# Patient Record
Sex: FEMALE | Race: BLACK OR AFRICAN AMERICAN | NOT HISPANIC OR LATINO | URBAN - METROPOLITAN AREA
[De-identification: names, ages, dates, MRNs, and addresses within clinical notes are randomized per-mention and may not be internally consistent; named-entity substitution may affect disease eponyms.]

---

## 2023-10-19 PROBLEM — E66.9 OBESITY (BMI 30-39.9): Status: ACTIVE | Noted: 2023-10-19

## 2023-10-19 PROBLEM — R53.83 FATIGUE: Status: ACTIVE | Noted: 2023-10-19

## 2023-10-19 RX ORDER — DOCUSATE SODIUM 100 MG/1
1 CAPSULE, LIQUID FILLED ORAL 2 TIMES DAILY PRN
COMMUNITY
Start: 2018-11-02

## 2023-10-19 RX ORDER — NAPROXEN 500 MG/1
1 TABLET ORAL 2 TIMES DAILY
COMMUNITY
Start: 2019-02-06

## 2023-10-20 ENCOUNTER — TELEPHONE (OUTPATIENT)
Dept: OBSTETRICS AND GYNECOLOGY | Facility: CLINIC | Age: 43
End: 2023-10-20
Payer: COMMERCIAL

## 2023-10-20 ENCOUNTER — APPOINTMENT (OUTPATIENT)
Dept: OBSTETRICS AND GYNECOLOGY | Facility: CLINIC | Age: 43
End: 2023-10-20
Payer: COMMERCIAL

## 2023-10-20 NOTE — TELEPHONE ENCOUNTER
Pt has apt for APE today Provider does not have a schedule. Called pt to reschedule LM to call office at [phone].

## 2024-07-16 ENCOUNTER — HOSPITAL ENCOUNTER (EMERGENCY)
Facility: HOSPITAL | Age: 44
Discharge: HOME | End: 2024-07-16
Payer: COMMERCIAL

## 2024-07-16 VITALS
WEIGHT: 183 LBS | SYSTOLIC BLOOD PRESSURE: 150 MMHG | RESPIRATION RATE: 18 BRPM | TEMPERATURE: 98.3 F | HEART RATE: 79 BPM | DIASTOLIC BLOOD PRESSURE: 100 MMHG | OXYGEN SATURATION: 99 % | BODY MASS INDEX: 34.58 KG/M2

## 2024-07-16 DIAGNOSIS — N76.0 BV (BACTERIAL VAGINOSIS): ICD-10-CM

## 2024-07-16 DIAGNOSIS — R10.13 EPIGASTRIC PAIN: ICD-10-CM

## 2024-07-16 DIAGNOSIS — N30.00 ACUTE CYSTITIS WITHOUT HEMATURIA: Primary | ICD-10-CM

## 2024-07-16 DIAGNOSIS — B96.89 BV (BACTERIAL VAGINOSIS): ICD-10-CM

## 2024-07-16 LAB
ALBUMIN SERPL BCP-MCNC: 4.4 G/DL (ref 3.4–5)
ALP SERPL-CCNC: 66 U/L (ref 33–110)
ALT SERPL W P-5'-P-CCNC: 11 U/L (ref 7–45)
ANION GAP SERPL CALC-SCNC: 14 MMOL/L (ref 10–20)
APPEARANCE UR: ABNORMAL
AST SERPL W P-5'-P-CCNC: 14 U/L (ref 9–39)
BASOPHILS # BLD AUTO: 0.03 X10*3/UL (ref 0–0.1)
BASOPHILS NFR BLD AUTO: 0.2 %
BILIRUB SERPL-MCNC: 0.4 MG/DL (ref 0–1.2)
BILIRUB UR STRIP.AUTO-MCNC: NEGATIVE MG/DL
BUN SERPL-MCNC: 8 MG/DL (ref 6–23)
CALCIUM SERPL-MCNC: 9.5 MG/DL (ref 8.6–10.6)
CHLORIDE SERPL-SCNC: 101 MMOL/L (ref 98–107)
CLUE CELLS SPEC QL WET PREP: PRESENT
CO2 SERPL-SCNC: 28 MMOL/L (ref 21–32)
COLOR UR: ABNORMAL
CREAT SERPL-MCNC: 0.67 MG/DL (ref 0.5–1.05)
EGFRCR SERPLBLD CKD-EPI 2021: >90 ML/MIN/1.73M*2
EOSINOPHIL # BLD AUTO: 0.06 X10*3/UL (ref 0–0.7)
EOSINOPHIL NFR BLD AUTO: 0.4 %
ERYTHROCYTE [DISTWIDTH] IN BLOOD BY AUTOMATED COUNT: 13.6 % (ref 11.5–14.5)
GLUCOSE SERPL-MCNC: 101 MG/DL (ref 74–99)
GLUCOSE UR STRIP.AUTO-MCNC: NORMAL MG/DL
HCT VFR BLD AUTO: 40.7 % (ref 36–46)
HGB BLD-MCNC: 13.8 G/DL (ref 12–16)
IMM GRANULOCYTES # BLD AUTO: 0.04 X10*3/UL (ref 0–0.7)
IMM GRANULOCYTES NFR BLD AUTO: 0.3 % (ref 0–0.9)
KETONES UR STRIP.AUTO-MCNC: ABNORMAL MG/DL
LEUKOCYTE ESTERASE UR QL STRIP.AUTO: ABNORMAL
LIPASE SERPL-CCNC: 26 U/L (ref 9–82)
LYMPHOCYTES # BLD AUTO: 2.02 X10*3/UL (ref 1.2–4.8)
LYMPHOCYTES NFR BLD AUTO: 15.1 %
MCH RBC QN AUTO: 28.4 PG (ref 26–34)
MCHC RBC AUTO-ENTMCNC: 33.9 G/DL (ref 32–36)
MCV RBC AUTO: 84 FL (ref 80–100)
MONOCYTES # BLD AUTO: 0.98 X10*3/UL (ref 0.1–1)
MONOCYTES NFR BLD AUTO: 7.3 %
MUCOUS THREADS #/AREA URNS AUTO: ABNORMAL /LPF
NEUTROPHILS # BLD AUTO: 10.24 X10*3/UL (ref 1.2–7.7)
NEUTROPHILS NFR BLD AUTO: 76.7 %
NITRITE UR QL STRIP.AUTO: NEGATIVE
NRBC BLD-RTO: 0 /100 WBCS (ref 0–0)
PH UR STRIP.AUTO: 8.5 [PH]
PLATELET # BLD AUTO: 245 X10*3/UL (ref 150–450)
POTASSIUM SERPL-SCNC: 3.7 MMOL/L (ref 3.5–5.3)
PREGNANCY TEST URINE, POC: NEGATIVE
PROT SERPL-MCNC: 7.7 G/DL (ref 6.4–8.2)
PROT UR STRIP.AUTO-MCNC: ABNORMAL MG/DL
RBC # BLD AUTO: 4.86 X10*6/UL (ref 4–5.2)
RBC # UR STRIP.AUTO: ABNORMAL /UL
RBC #/AREA URNS AUTO: >20 /HPF
SODIUM SERPL-SCNC: 139 MMOL/L (ref 136–145)
SP GR UR STRIP.AUTO: 1.04
SQUAMOUS #/AREA URNS AUTO: ABNORMAL /HPF
T VAGINALIS SPEC QL WET PREP: ABNORMAL
UROBILINOGEN UR STRIP.AUTO-MCNC: ABNORMAL MG/DL
WBC # BLD AUTO: 13.4 X10*3/UL (ref 4.4–11.3)
WBC #/AREA URNS AUTO: ABNORMAL /HPF
WBC VAG QL WET PREP: ABNORMAL
YEAST VAG QL WET PREP: ABNORMAL

## 2024-07-16 PROCEDURE — 96375 TX/PRO/DX INJ NEW DRUG ADDON: CPT

## 2024-07-16 PROCEDURE — 2500000004 HC RX 250 GENERAL PHARMACY W/ HCPCS (ALT 636 FOR OP/ED): Mod: SE | Performed by: PHYSICIAN ASSISTANT

## 2024-07-16 PROCEDURE — 81001 URINALYSIS AUTO W/SCOPE: CPT | Performed by: PHYSICIAN ASSISTANT

## 2024-07-16 PROCEDURE — 99284 EMERGENCY DEPT VISIT MOD MDM: CPT | Performed by: PHYSICIAN ASSISTANT

## 2024-07-16 PROCEDURE — 80053 COMPREHEN METABOLIC PANEL: CPT | Performed by: PHYSICIAN ASSISTANT

## 2024-07-16 PROCEDURE — 96361 HYDRATE IV INFUSION ADD-ON: CPT

## 2024-07-16 PROCEDURE — 99284 EMERGENCY DEPT VISIT MOD MDM: CPT | Mod: 25

## 2024-07-16 PROCEDURE — 83690 ASSAY OF LIPASE: CPT | Performed by: PHYSICIAN ASSISTANT

## 2024-07-16 PROCEDURE — 81025 URINE PREGNANCY TEST: CPT | Performed by: PHYSICIAN ASSISTANT

## 2024-07-16 PROCEDURE — 85025 COMPLETE CBC W/AUTO DIFF WBC: CPT | Performed by: PHYSICIAN ASSISTANT

## 2024-07-16 PROCEDURE — 96374 THER/PROPH/DIAG INJ IV PUSH: CPT

## 2024-07-16 PROCEDURE — 36415 COLL VENOUS BLD VENIPUNCTURE: CPT | Performed by: PHYSICIAN ASSISTANT

## 2024-07-16 PROCEDURE — 87491 CHLMYD TRACH DNA AMP PROBE: CPT | Performed by: PHYSICIAN ASSISTANT

## 2024-07-16 PROCEDURE — 87210 SMEAR WET MOUNT SALINE/INK: CPT | Performed by: PHYSICIAN ASSISTANT

## 2024-07-16 RX ORDER — ACETAMINOPHEN 325 MG/1
975 TABLET ORAL ONCE
Status: COMPLETED | OUTPATIENT
Start: 2024-07-16 | End: 2024-07-16

## 2024-07-16 RX ORDER — ONDANSETRON HYDROCHLORIDE 2 MG/ML
4 INJECTION, SOLUTION INTRAVENOUS ONCE
Status: COMPLETED | OUTPATIENT
Start: 2024-07-16 | End: 2024-07-16

## 2024-07-16 RX ORDER — METRONIDAZOLE 500 MG/1
500 TABLET ORAL 2 TIMES DAILY
Qty: 14 TABLET | Refills: 0 | Status: SHIPPED | OUTPATIENT
Start: 2024-07-16 | End: 2024-07-23

## 2024-07-16 RX ORDER — NITROFURANTOIN 25; 75 MG/1; MG/1
100 CAPSULE ORAL 2 TIMES DAILY
Qty: 10 CAPSULE | Refills: 0 | Status: SHIPPED | OUTPATIENT
Start: 2024-07-16 | End: 2024-07-21

## 2024-07-16 RX ORDER — FAMOTIDINE 10 MG/ML
20 INJECTION INTRAVENOUS ONCE
Status: COMPLETED | OUTPATIENT
Start: 2024-07-16 | End: 2024-07-16

## 2024-07-16 RX ORDER — KETOROLAC TROMETHAMINE 15 MG/ML
15 INJECTION, SOLUTION INTRAMUSCULAR; INTRAVENOUS ONCE
Status: COMPLETED | OUTPATIENT
Start: 2024-07-16 | End: 2024-07-16

## 2024-07-16 RX ORDER — ONDANSETRON 4 MG/1
4 TABLET, ORALLY DISINTEGRATING ORAL EVERY 8 HOURS PRN
Qty: 20 TABLET | Refills: 0 | Status: SHIPPED | OUTPATIENT
Start: 2024-07-16 | End: 2024-07-23

## 2024-07-16 RX ORDER — FAMOTIDINE 20 MG/1
20 TABLET, FILM COATED ORAL 2 TIMES DAILY PRN
Qty: 30 TABLET | Refills: 0 | Status: SHIPPED | OUTPATIENT
Start: 2024-07-16 | End: 2024-07-31

## 2024-07-16 ASSESSMENT — COLUMBIA-SUICIDE SEVERITY RATING SCALE - C-SSRS
1. IN THE PAST MONTH, HAVE YOU WISHED YOU WERE DEAD OR WISHED YOU COULD GO TO SLEEP AND NOT WAKE UP?: NO
2. HAVE YOU ACTUALLY HAD ANY THOUGHTS OF KILLING YOURSELF?: NO
6. HAVE YOU EVER DONE ANYTHING, STARTED TO DO ANYTHING, OR PREPARED TO DO ANYTHING TO END YOUR LIFE?: NO

## 2024-07-16 ASSESSMENT — PAIN DESCRIPTION - LOCATION: LOCATION: ABDOMEN

## 2024-07-16 ASSESSMENT — LIFESTYLE VARIABLES
TOTAL SCORE: 0
EVER HAD A DRINK FIRST THING IN THE MORNING TO STEADY YOUR NERVES TO GET RID OF A HANGOVER: NO
HAVE YOU EVER FELT YOU SHOULD CUT DOWN ON YOUR DRINKING: NO
EVER FELT BAD OR GUILTY ABOUT YOUR DRINKING: NO
HAVE PEOPLE ANNOYED YOU BY CRITICIZING YOUR DRINKING: NO

## 2024-07-16 ASSESSMENT — PAIN SCALES - GENERAL: PAINLEVEL_OUTOF10: 10 - WORST POSSIBLE PAIN

## 2024-07-16 NOTE — ED TRIAGE NOTES
UPPER ABD PAIN STARTED YESTERDAY. N/V AND PAIN. DENIES URINARY SYMPTOMS, CONCERN FOR STDS. ENDORSES BEING ON HER CYCLE AND DECREASED PO INTAKE.

## 2024-07-16 NOTE — ED PROVIDER NOTES
HPI   Chief Complaint   Patient presents with    Abdominal Pain    Vomiting    Nausea       This is a 44-year-old female with reported history of bilateral salpingectomy who presents to the emergency department with concern for couple days of epigastric pain, nausea and vomiting of nonbloody nonbilious emesis.  Has no associated urinary symptoms or diarrhea or constipation.  The patient is also requesting to get tested for STDs.  She is in a monogamous relationship.  She is quite tearful during my encounter.              Patient History   Past Medical History:   Diagnosis Date    Encounter for screening for infections with a predominantly sexual mode of transmission 02/19/2015    Screen for STD (sexually transmitted disease)     Past Surgical History:   Procedure Laterality Date    OTHER SURGICAL HISTORY  11/20/2018    Laparoscopic tubal ligation     Family History   Problem Relation Name Age of Onset    No Known Problems Mother      Colon cancer Maternal Grandmother       Social History     Tobacco Use    Smoking status: Not on file    Smokeless tobacco: Not on file   Substance Use Topics    Alcohol use: Not on file    Drug use: Not on file       Physical Exam   ED Triage Vitals [07/16/24 1554]   Temperature Heart Rate Respirations BP   36.8 °C (98.3 °F) 79 18 (!) 150/100      Pulse Ox Temp src Heart Rate Source Patient Position   99 % -- -- --      BP Location FiO2 (%)     -- --       Physical Exam  Vitals reviewed.   Constitutional:       Appearance: She is well-developed.   HENT:      Head: Normocephalic and atraumatic.   Cardiovascular:      Rate and Rhythm: Normal rate and regular rhythm.   Pulmonary:      Effort: Pulmonary effort is normal.      Breath sounds: Normal breath sounds.   Abdominal:      Tenderness: There is abdominal tenderness in the epigastric area. There is no guarding or rebound.   Skin:     General: Skin is warm.   Neurological:      Mental Status: She is alert.           ED Course & MDM    Diagnoses as of 07/16/24 2152   Acute cystitis without hematuria   BV (bacterial vaginosis)   Epigastric pain                       Tristen Coma Scale Score: 15                        Medical Decision Making  34-year-old female, is alert and oriented x 3, afebrile and hemodynamically stable.  Presenting with concern for nausea, vomiting, upper abdominal pain.    Examination reveals a soft abdomen that is tender in the epigastrium.  IV access was established, she was given a liter of LR, Zofran, Pepcid and Toradol.    Vaginal collection obtained by self swab.  Wet prep positive for clue cells, negative for yeast and trichomonas.  Urinalysis revealing elevated WBC, leukocyte esterase.    The patient passed p.o. challenge and was feeling better.  She will be discharged with a prescription for Flagyl and Macrobid, as well as Pepcid and Zofran.  PCP follow-up advised.        Procedure  Procedures     Osbaldo Choi PA-C  07/16/24 2154

## 2024-07-17 LAB
C TRACH RRNA SPEC QL NAA+PROBE: NEGATIVE
N GONORRHOEA DNA SPEC QL PROBE+SIG AMP: NEGATIVE

## 2024-09-09 ENCOUNTER — APPOINTMENT (OUTPATIENT)
Dept: OBSTETRICS AND GYNECOLOGY | Facility: CLINIC | Age: 44
End: 2024-09-09
Payer: COMMERCIAL

## 2024-09-11 PROBLEM — N76.0 BACTERIAL VAGINOSIS: Status: ACTIVE | Noted: 2024-09-11

## 2024-09-11 PROBLEM — N30.00 ACUTE CYSTITIS WITHOUT HEMATURIA: Status: ACTIVE | Noted: 2024-07-16

## 2024-09-11 PROBLEM — B96.89 BACTERIAL VAGINOSIS: Status: ACTIVE | Noted: 2024-07-26

## 2024-09-11 PROBLEM — N76.0 BACTERIAL VAGINOSIS: Status: ACTIVE | Noted: 2024-07-26

## 2024-09-11 PROBLEM — B96.89 BACTERIAL VAGINOSIS: Status: ACTIVE | Noted: 2024-09-11

## 2024-10-04 ENCOUNTER — APPOINTMENT (OUTPATIENT)
Dept: OBSTETRICS AND GYNECOLOGY | Facility: CLINIC | Age: 44
End: 2024-10-04
Payer: COMMERCIAL

## 2024-10-11 ENCOUNTER — APPOINTMENT (OUTPATIENT)
Dept: OBSTETRICS AND GYNECOLOGY | Facility: CLINIC | Age: 44
End: 2024-10-11
Payer: COMMERCIAL

## 2024-10-23 ENCOUNTER — LAB (OUTPATIENT)
Dept: LAB | Facility: LAB | Age: 44
End: 2024-10-23
Payer: COMMERCIAL

## 2024-10-23 ENCOUNTER — OFFICE VISIT (OUTPATIENT)
Dept: OBSTETRICS AND GYNECOLOGY | Facility: CLINIC | Age: 44
End: 2024-10-23
Payer: COMMERCIAL

## 2024-10-23 VITALS
HEART RATE: 98 BPM | HEIGHT: 61 IN | WEIGHT: 181.6 LBS | SYSTOLIC BLOOD PRESSURE: 107 MMHG | BODY MASS INDEX: 34.29 KG/M2 | DIASTOLIC BLOOD PRESSURE: 77 MMHG

## 2024-10-23 DIAGNOSIS — Z11.3 SCREENING EXAMINATION FOR STI: ICD-10-CM

## 2024-10-23 DIAGNOSIS — N92.0 MENORRHAGIA WITH REGULAR CYCLE: ICD-10-CM

## 2024-10-23 DIAGNOSIS — Z71.6 ENCOUNTER FOR TOBACCO USE CESSATION COUNSELING: Primary | ICD-10-CM

## 2024-10-23 LAB
ANION GAP SERPL CALC-SCNC: 13 MMOL/L (ref 10–20)
BUN SERPL-MCNC: 10 MG/DL (ref 6–23)
CALCIUM SERPL-MCNC: 9.1 MG/DL (ref 8.6–10.6)
CHLORIDE SERPL-SCNC: 104 MMOL/L (ref 98–107)
CO2 SERPL-SCNC: 25 MMOL/L (ref 21–32)
CREAT SERPL-MCNC: 0.63 MG/DL (ref 0.5–1.05)
EGFRCR SERPLBLD CKD-EPI 2021: >90 ML/MIN/1.73M*2
ERYTHROCYTE [DISTWIDTH] IN BLOOD BY AUTOMATED COUNT: 14.1 % (ref 11.5–14.5)
EST. AVERAGE GLUCOSE BLD GHB EST-MCNC: 100 MG/DL
GLUCOSE SERPL-MCNC: 104 MG/DL (ref 74–99)
HBA1C MFR BLD: 5.1 %
HBV SURFACE AG SERPL QL IA: NONREACTIVE
HCT VFR BLD AUTO: 38.1 % (ref 36–46)
HCV AB SER QL: NONREACTIVE
HGB BLD-MCNC: 11.5 G/DL (ref 12–16)
HIV 1+2 AB+HIV1 P24 AG SERPL QL IA: NONREACTIVE
MCH RBC QN AUTO: 25.8 PG (ref 26–34)
MCHC RBC AUTO-ENTMCNC: 30.2 G/DL (ref 32–36)
MCV RBC AUTO: 86 FL (ref 80–100)
NRBC BLD-RTO: 0 /100 WBCS (ref 0–0)
PLATELET # BLD AUTO: 305 X10*3/UL (ref 150–450)
POTASSIUM SERPL-SCNC: 4.6 MMOL/L (ref 3.5–5.3)
RBC # BLD AUTO: 4.45 X10*6/UL (ref 4–5.2)
SODIUM SERPL-SCNC: 137 MMOL/L (ref 136–145)
TREPONEMA PALLIDUM IGG+IGM AB [PRESENCE] IN SERUM OR PLASMA BY IMMUNOASSAY: NONREACTIVE
TSH SERPL-ACNC: 0.77 MIU/L (ref 0.44–3.98)
WBC # BLD AUTO: 7.8 X10*3/UL (ref 4.4–11.3)

## 2024-10-23 PROCEDURE — 36415 COLL VENOUS BLD VENIPUNCTURE: CPT

## 2024-10-23 PROCEDURE — 87661 TRICHOMONAS VAGINALIS AMPLIF: CPT | Performed by: STUDENT IN AN ORGANIZED HEALTH CARE EDUCATION/TRAINING PROGRAM

## 2024-10-23 PROCEDURE — 83036 HEMOGLOBIN GLYCOSYLATED A1C: CPT

## 2024-10-23 PROCEDURE — 87340 HEPATITIS B SURFACE AG IA: CPT

## 2024-10-23 PROCEDURE — 2500000001 HC RX 250 WO HCPCS SELF ADMINISTERED DRUGS (ALT 637 FOR MEDICARE OP): Mod: SE | Performed by: STUDENT IN AN ORGANIZED HEALTH CARE EDUCATION/TRAINING PROGRAM

## 2024-10-23 PROCEDURE — 85027 COMPLETE CBC AUTOMATED: CPT

## 2024-10-23 PROCEDURE — 86803 HEPATITIS C AB TEST: CPT

## 2024-10-23 PROCEDURE — 80048 BASIC METABOLIC PNL TOTAL CA: CPT

## 2024-10-23 PROCEDURE — 99386 PREV VISIT NEW AGE 40-64: CPT | Performed by: STUDENT IN AN ORGANIZED HEALTH CARE EDUCATION/TRAINING PROGRAM

## 2024-10-23 PROCEDURE — 99214 OFFICE O/P EST MOD 30 MIN: CPT | Performed by: STUDENT IN AN ORGANIZED HEALTH CARE EDUCATION/TRAINING PROGRAM

## 2024-10-23 PROCEDURE — 2500000004 HC RX 250 GENERAL PHARMACY W/ HCPCS (ALT 636 FOR OP/ED): Mod: SE | Performed by: STUDENT IN AN ORGANIZED HEALTH CARE EDUCATION/TRAINING PROGRAM

## 2024-10-23 PROCEDURE — 99406 BEHAV CHNG SMOKING 3-10 MIN: CPT | Performed by: STUDENT IN AN ORGANIZED HEALTH CARE EDUCATION/TRAINING PROGRAM

## 2024-10-23 PROCEDURE — 87389 HIV-1 AG W/HIV-1&-2 AB AG IA: CPT

## 2024-10-23 PROCEDURE — 87491 CHLMYD TRACH DNA AMP PROBE: CPT | Performed by: STUDENT IN AN ORGANIZED HEALTH CARE EDUCATION/TRAINING PROGRAM

## 2024-10-23 PROCEDURE — 58100 BIOPSY OF UTERUS LINING: CPT | Performed by: STUDENT IN AN ORGANIZED HEALTH CARE EDUCATION/TRAINING PROGRAM

## 2024-10-23 PROCEDURE — 86780 TREPONEMA PALLIDUM: CPT

## 2024-10-23 PROCEDURE — 96372 THER/PROPH/DIAG INJ SC/IM: CPT | Performed by: STUDENT IN AN ORGANIZED HEALTH CARE EDUCATION/TRAINING PROGRAM

## 2024-10-23 PROCEDURE — 84443 ASSAY THYROID STIM HORMONE: CPT

## 2024-10-23 RX ORDER — ACETAMINOPHEN 325 MG/1
975 TABLET ORAL ONCE
OUTPATIENT
Start: 2024-10-23 | End: 2024-10-23

## 2024-10-23 RX ORDER — CELECOXIB 400 MG/1
400 CAPSULE ORAL ONCE
OUTPATIENT
Start: 2024-10-23 | End: 2024-10-23

## 2024-10-23 RX ORDER — GABAPENTIN 600 MG/1
600 TABLET ORAL ONCE
OUTPATIENT
Start: 2024-10-23 | End: 2024-10-23

## 2024-10-23 RX ORDER — NICOTINE 7MG/24HR
1 PATCH, TRANSDERMAL 24 HOURS TRANSDERMAL EVERY 24 HOURS
Qty: 21 PATCH | Refills: 0 | Status: SHIPPED | OUTPATIENT
Start: 2024-12-04 | End: 2024-12-25

## 2024-10-23 RX ORDER — IBUPROFEN 200 MG
1 TABLET ORAL EVERY 24 HOURS
Qty: 21 PATCH | Refills: 0 | Status: SHIPPED | OUTPATIENT
Start: 2024-10-23 | End: 2024-11-13

## 2024-10-23 RX ORDER — IBUPROFEN 200 MG
1 TABLET ORAL EVERY 24 HOURS
Qty: 21 PATCH | Refills: 0 | Status: SHIPPED | OUTPATIENT
Start: 2024-11-13 | End: 2024-12-04

## 2024-10-23 RX ORDER — ASPIRIN/CALCIUM CARB/MAGNESIUM 325 MG
4 TABLET ORAL EVERY 2 HOUR PRN
Qty: 100 LOZENGE | Refills: 2 | Status: SHIPPED | OUTPATIENT
Start: 2024-10-23 | End: 2024-11-22

## 2024-10-23 RX ORDER — IBUPROFEN 600 MG/1
600 TABLET ORAL ONCE
Status: COMPLETED | OUTPATIENT
Start: 2024-10-23 | End: 2024-10-23

## 2024-10-23 RX ORDER — MEDROXYPROGESTERONE ACETATE 150 MG/ML
150 INJECTION, SUSPENSION INTRAMUSCULAR
Status: SHIPPED | OUTPATIENT
Start: 2024-10-23 | End: 2025-10-18

## 2024-10-23 ASSESSMENT — COLUMBIA-SUICIDE SEVERITY RATING SCALE - C-SSRS
1. IN THE PAST MONTH, HAVE YOU WISHED YOU WERE DEAD OR WISHED YOU COULD GO TO SLEEP AND NOT WAKE UP?: NO
6. HAVE YOU EVER DONE ANYTHING, STARTED TO DO ANYTHING, OR PREPARED TO DO ANYTHING TO END YOUR LIFE?: NO
2. HAVE YOU ACTUALLY HAD ANY THOUGHTS OF KILLING YOURSELF?: NO

## 2024-10-23 ASSESSMENT — ENCOUNTER SYMPTOMS
EYES NEGATIVE: 0
CARDIOVASCULAR NEGATIVE: 0
ALLERGIC/IMMUNOLOGIC NEGATIVE: 0
MUSCULOSKELETAL NEGATIVE: 0
NEUROLOGICAL NEGATIVE: 0
PSYCHIATRIC NEGATIVE: 0
GASTROINTESTINAL NEGATIVE: 0
CONSTITUTIONAL NEGATIVE: 0
ENDOCRINE NEGATIVE: 0
RESPIRATORY NEGATIVE: 0
HEMATOLOGIC/LYMPHATIC NEGATIVE: 0

## 2024-10-23 ASSESSMENT — PATIENT HEALTH QUESTIONNAIRE - PHQ9
2. FEELING DOWN, DEPRESSED OR HOPELESS: NOT AT ALL
SUM OF ALL RESPONSES TO PHQ9 QUESTIONS 1 AND 2: 0
1. LITTLE INTEREST OR PLEASURE IN DOING THINGS: NOT AT ALL

## 2024-10-23 NOTE — ASSESSMENT & PLAN NOTE
Discussed requirement for cessation prior to surgery  Reviewed NRT - desires patch and lozenge  Encouragement provided, Rx sent

## 2024-10-23 NOTE — PROGRESS NOTES
Patient here for Depo injection.  Last Depo: first  discussed Depo-Provera side effects ,calcium.  Depo given IM Right Ventrogluteal  region. Depo supplied by office depo. Patient tolerated well.  Patient to RTC between 01/08/2025 to 01/22/2025 for depo.  Patient verbalized understanding and all questions were answered.

## 2024-10-23 NOTE — ASSESSMENT & PLAN NOTE
Reviewed physiology of menstrual cycle including roles of estrogen and progesterone. Discussed cyclic nature of hormones and roles in ovulation and menstruation.  Reviewed various etiologies of abnormal uterine bleeding including structural (polyps, adenomyosis, fibroids, malignancy) and physiologic causes (clotting disorder, ovulatory dysfunction, iatrogenic source).  Previous imaging: none.  Previous biopsies/surgeries: none.  Based on her risk factors of obesity, multiparity, differential diagnosis includes hyperplasia, adenomyosis, fibroids.  Prior treatments: none.  Discussed management options including various forms of hormonal control (pill, patch, ring, injection, implant and IUD) as well as surgical approaches. Briefly discussed risks, benefits of each.   Patient does not desire future fertility.   Recommendations:  - Imaging: TVUS ordered..  - Management: Desires surgical management. Depo today for bridging to surgical scheduling while quitting smoking and completing evaluation. CBC. BMP. TSH  - Pap UTD.  - EMB today without issue.  - Appropriate candidate for vNOTES hysterectomy pending US results.  - Case request entered, pre-op visit on 1/3/24 with planned surgery in Jan.

## 2024-10-23 NOTE — PROGRESS NOTES
"Dayami Major is a 44 y.o.  female who is here for a routine exam.   PCP = No primary care provider on file.    Subjective     Concerns today: Heavy menstrual bleeding - uses 10 pads/day with period. Very month, lasting 7 days, heaviest first 3-4 days. S/p BTL. Also reports hot flashes. Also reports heavy cramping. Interested in hysterectomy. Takes ibuprofen.    OB/Gyn History:  Menarche/Menopause: unsure  Menstrual cycle concerns: as above  Sexual Health Concerns: none  Current contraception: s/p salpingectomy    Preventative:  Mammogram: due   Last Colonoscopy: age 46   DM Screening: due  HPV vaccination: n/a  Exercise: active lifestyle, no particular  Diet: no particular  Seat Belt Use: always    Social History:  Living Situation: lives with children and mom  School/Employment: manager at [employer]  Substance use updated in chart. Tobacco cessation counseling provided, total time 6 mins.  Safe at Home?: Yes  Depression Screen/Mood concerns: No    Family history (specifically breast, ovarian, colon cancer) reviewed and updated in chart.   Personal medical and surgical history reviewed and updated in chart.   Obstetric history reviewed and updated in chart.  Pap history reviewed and updated in chart.         Objective   /77   Pulse 98   Ht 1.549 m (5' 1\")   Wt 82.4 kg (181 lb 9.6 oz)   LMP 10/06/2024   BMI 34.31 kg/m²   Physical Exam  Vitals reviewed. Exam conducted with a chaperone present.   Constitutional:       Appearance: Normal appearance.   HENT:      Head: Normocephalic.   Cardiovascular:      Rate and Rhythm: Normal rate and regular rhythm.   Pulmonary:      Effort: Pulmonary effort is normal. No respiratory distress.   Chest:   Breasts:     Right: Normal. No swelling, mass or skin change.      Left: Normal. No swelling, mass or skin change.   Abdominal:      General: There is no distension.      Palpations: Abdomen is soft. There is no mass.      Tenderness: There is no abdominal " tenderness. There is no guarding or rebound.   Genitourinary:     Comments: Normal appearing external female genitalia. Vaginal mucosa normal appearing without lesions. Cervix without lesions.    Bimanual exam with normal sized uterus, mobile, anteverted, no adnexal masses or tenderness.  Lymphadenopathy:      Upper Body:      Right upper body: No supraclavicular, axillary or pectoral adenopathy.      Left upper body: No supraclavicular, axillary or pectoral adenopathy.   Skin:     General: Skin is warm and dry.   Neurological:      General: No focal deficit present.      Mental Status: She is alert.   Psychiatric:         Mood and Affect: Mood normal.         Behavior: Behavior normal.         Thought Content: Thought content normal.         Judgment: Judgment normal.        Biopsy endometrium    Date/Time: 10/23/2024 1:30 PM    Performed by: Sarath Nogueira MD  Authorized by: Sarath Nogueira MD    Comments:     Procedure comments: EMB Procedure Note    The patient was placed in dorsal lithotomy position. A speculum was inserted. The cervix was prepped with betadine. Endometrial biopsy pipelle inserted through external cervical os. Suction was applied and tissue collected. 1 pass. The speculum was removed. There were no complications. The patient tolerated procedure well and was given the routine instructions.              Assessment/Plan      Dayami Major is a 44 y.o.  female presenting today for annual exam with the following problems addressed:    Problem List Items Addressed This Visit       Menorrhagia with regular cycle     Reviewed physiology of menstrual cycle including roles of estrogen and progesterone. Discussed cyclic nature of hormones and roles in ovulation and menstruation.  Reviewed various etiologies of abnormal uterine bleeding including structural (polyps, adenomyosis, fibroids, malignancy) and physiologic causes (clotting disorder, ovulatory dysfunction, iatrogenic  source).  Previous imaging: none.  Previous biopsies/surgeries: none.  Based on her risk factors of obesity, multiparity, differential diagnosis includes hyperplasia, adenomyosis, fibroids.  Prior treatments: none.  Discussed management options including various forms of hormonal control (pill, patch, ring, injection, implant and IUD) as well as surgical approaches. Briefly discussed risks, benefits of each.   Patient does not desire future fertility.   Recommendations:  - Imaging: TVUS ordered..  - Management: Desires surgical management. Depo today for bridging to surgical scheduling while quitting smoking and completing evaluation. CBC. BMP. TSH  - Pap UTD.  - EMB today without issue.  - Appropriate candidate for vNOTES hysterectomy pending US results.  - Case request entered, pre-op visit on 1/3/24 with planned surgery in Jan.           Relevant Medications    medroxyPROGESTERone (Depo-Provera) injection 150 mg    ibuprofen tablet 600 mg (Completed)    Other Relevant Orders    BI mammo bilateral screening    CBC    Hemoglobin A1C    Basic Metabolic Panel    TSH with reflex to Free T4 if abnormal    Surgical Pathology Exam    US PELVIS TRANSABDOMINAL WITH TRANSVAGINAL    Biopsy endometrium (Completed)    Case Request Operating Room: vNOTES hysterectomy, Cystoscopy Rigid (Completed)    Encounter for tobacco use cessation counseling - Primary     Discussed requirement for cessation prior to surgery  Reviewed NRT - desires patch and lozenge  Encouragement provided, Rx sent         Relevant Medications    nicotine (Nicoderm CQ) 21 mg/24 hr patch    nicotine (Nicoderm CQ) 14 mg/24 hr patch (Start on 11/13/2024)    nicotine (Nicoderm CQ) 7 mg/24 hr patch (Start on 12/4/2024)    nicotine polacrilex (Commit) 4 mg lozenge     Other Visit Diagnoses       Screening examination for STI        Relevant Orders    Syphilis Screen with Reflex    HIV 1/2 Antigen/Antibody Screen with Reflex to Confirmation    Hepatitis C Antibody     Hepatitis B Surface Antigen    Trichomonas vaginalis, Amplified    C. trachomatis / N. gonorrhoeae, Amplified            Healthcare Maintenance:  - Reviewed healthy lifestyle including well rounded diet and exercise (moderate intensity 150min/week; high intensity 75min/week)  - Immunizations: UTD  - Pap UTD  - Mammogram ordered  - STI testing at patient request

## 2024-10-24 LAB
C TRACH RRNA SPEC QL NAA+PROBE: NEGATIVE
N GONORRHOEA DNA SPEC QL PROBE+SIG AMP: NEGATIVE
T VAGINALIS RRNA SPEC QL NAA+PROBE: NEGATIVE

## 2024-10-29 LAB
LABORATORY COMMENT REPORT: NORMAL
PATH REPORT.FINAL DX SPEC: NORMAL
PATH REPORT.GROSS SPEC: NORMAL
PATH REPORT.RELEVANT HX SPEC: NORMAL
PATH REPORT.TOTAL CANCER: NORMAL

## 2024-11-07 ENCOUNTER — HOSPITAL ENCOUNTER (OUTPATIENT)
Dept: RADIOLOGY | Facility: HOSPITAL | Age: 44
Discharge: HOME | End: 2024-11-07
Payer: COMMERCIAL

## 2024-11-07 DIAGNOSIS — N92.0 MENORRHAGIA WITH REGULAR CYCLE: ICD-10-CM

## 2024-11-07 PROCEDURE — 76856 US EXAM PELVIC COMPLETE: CPT | Performed by: RADIOLOGY

## 2024-11-07 PROCEDURE — 76856 US EXAM PELVIC COMPLETE: CPT

## 2024-11-07 PROCEDURE — 76830 TRANSVAGINAL US NON-OB: CPT | Performed by: RADIOLOGY

## 2024-11-20 ENCOUNTER — TELEPHONE (OUTPATIENT)
Dept: OBSTETRICS AND GYNECOLOGY | Facility: CLINIC | Age: 44
End: 2024-11-20
Payer: COMMERCIAL

## 2024-11-20 NOTE — TELEPHONE ENCOUNTER
"----- Message from Sarath Nogueira sent at 11/19/2024  6:44 PM EST -----  Regarding: RE: KELSEY Nogueira has a provcedure 1-3-25 at [facility] with attached patient  Can we bring her into office sometime in December to check in on quitting smoking and sign consents? You can double book her at [place] at your discretion - I know I am pretty full for a few weeks. Thank you!  ----- Message -----  From: Cyn Cehek RN  Sent: 11/19/2024  12:15 PM EST  To: Sarath Nogueira MD  Subject: FW: KELSEY Nogueira has a provcedure 1-3-2#    Hi, I am double checking this one.  I believe you had wanted her back for a pre op visit on 1/3 with you then to schedule her sx after that.  No CPM     I see her sx date is 1/3/25  Thanks  ----- Message -----  From: Amanda Valdez  Sent: 11/18/2024  11:49 AM EST  To: Sangeetha Jasso MA; Sarath Nogueira MD; #  Subject: [facility]cedure 1-3-25 at [facility] with attached patientLetters stating \"Getting Ready For Surgery\", \"Gynecologic Surgery: What to expect\" and the lab locations have been emailed to the patient today.     DR Nogueira, Pre-OP and or labs can now be ordered if you havent already done so, Thank you, Amanda"

## 2024-11-20 NOTE — TELEPHONE ENCOUNTER
Rn reached pt and she is scheduled to sign consents 12/20 and to see Dr. Nogueira.  She reports she has not picked up nictonine patches yet to help her stop smoking but feels she has cut done    Her sx date is 1/3/25  No cpm needed   Will need to have her labs done 48- to 72 hrs prior

## 2024-12-20 ENCOUNTER — OFFICE VISIT (OUTPATIENT)
Dept: OBSTETRICS AND GYNECOLOGY | Facility: CLINIC | Age: 44
End: 2024-12-20
Payer: COMMERCIAL

## 2024-12-20 VITALS
SYSTOLIC BLOOD PRESSURE: 120 MMHG | DIASTOLIC BLOOD PRESSURE: 89 MMHG | HEART RATE: 90 BPM | BODY MASS INDEX: 35.96 KG/M2 | WEIGHT: 190.31 LBS

## 2024-12-20 DIAGNOSIS — N92.0 MENORRHAGIA WITH REGULAR CYCLE: ICD-10-CM

## 2024-12-20 DIAGNOSIS — Z71.6 ENCOUNTER FOR TOBACCO USE CESSATION COUNSELING: Primary | ICD-10-CM

## 2024-12-20 PROCEDURE — 99214 OFFICE O/P EST MOD 30 MIN: CPT | Performed by: STUDENT IN AN ORGANIZED HEALTH CARE EDUCATION/TRAINING PROGRAM

## 2024-12-20 RX ORDER — IBUPROFEN 800 MG/1
800 TABLET ORAL EVERY 8 HOURS SCHEDULED
Qty: 90 TABLET | Refills: 0 | Status: SHIPPED | OUTPATIENT
Start: 2024-12-20 | End: 2025-01-19

## 2024-12-20 RX ORDER — ACETAMINOPHEN 500 MG
1000 TABLET ORAL EVERY 6 HOURS SCHEDULED
Qty: 112 TABLET | Refills: 0 | Status: SHIPPED | OUTPATIENT
Start: 2024-12-20 | End: 2025-01-03

## 2024-12-20 RX ORDER — OXYCODONE HYDROCHLORIDE 5 MG/1
5 TABLET ORAL EVERY 6 HOURS PRN
Qty: 15 TABLET | Refills: 0 | Status: SHIPPED | OUTPATIENT
Start: 2024-12-20 | End: 2024-12-27

## 2024-12-20 ASSESSMENT — PAIN SCALES - GENERAL: PAINLEVEL_OUTOF10: 0-NO PAIN

## 2024-12-20 NOTE — H&P (VIEW-ONLY)
Subjective   Patient ID: Dayami Major is a 44 y.o. female who presents for Pre-op Visit (Pt in office for pre op visit/LMP; Depo/Last pap 10/1/2021 wnl/Last mammogram (order placed 10/23/2024)/Chaperone accepted/) and hysterectomy consult.    Initially bleeding lightly for 2 weeks after depo, now with no bleeding and improved cramping. She is ready for her surgery.    Objective   Physical Exam  Constitutional:       General: She is not in acute distress.     Appearance: Normal appearance.   HENT:      Head: Normocephalic.   Cardiovascular:      Rate and Rhythm: Normal rate.   Pulmonary:      Effort: Pulmonary effort is normal. No respiratory distress.   Skin:     General: Skin is warm and dry.   Neurological:      Mental Status: She is alert and oriented to person, place, and time.   Psychiatric:         Mood and Affect: Mood normal.         Behavior: Behavior normal.         Thought Content: Thought content normal.         Judgment: Judgment normal.         Assessment/Plan   Problem List Items Addressed This Visit             ICD-10-CM    Menorrhagia with regular cycle N92.0     Preoperative Planning:   - Proceed with vNOTES hysterectomy on 1/3/24.   The risks, benefits, and alternatives to the procedure were discussed.   The common risks of bleeding, infection, injury to bowel, bladder, ureter, urethra, vagina, recurrence of prolapse, and incomplete bladder emptying were reviewed.  I explained that while we pay close attention to positioning prior to starting the procedure the positioning may result in hip or lower extremity discomfort and in rare cases lower extremity nerve injury. All of these risks were reviewed and questions were answered. Ms. Major accepted these risks and desires to proceed with the surgery.   - Consent forms were signed today.   - Pre-anesthesia visit not indicated  - Antibiotic prophylaxis: Preperation with Chlorhexidine prep.  Ancef 2 grams with Flagyl 500 mg    Surgical  considerations:  Preop VTE PPx:  SCDs  BMI:  Body mass index is 35.96 kg/m².   Home medications reviewed:  none   Anticoagulated:  none  Diabetic:  10/2024 A1C 5.1  Anemia:  11.5  Cardiac disease/EKG/Echo:  none  Pulmomary disease/CXR/CPAP:  none  Regular narcotic use / consideration for TAP/epidural:  no  Chronic kidney disease: none  Smoker:  decreased to 1 black n mild per day, continues to wean    Substance use:  none  Immunosuppression:   none  Mobility/orthopedic limitations:  none  Chronic steroid use:  none  Accepts Blood products: yes          Relevant Medications    acetaminophen (Tylenol Extra Strength) 500 mg tablet    ibuprofen 800 mg tablet    oxyCODONE (Roxicodone) 5 mg immediate release tablet    Other Relevant Orders    CBC    Basic Metabolic Panel    Type And Screen    Encounter for tobacco use cessation counseling - Primary Z71.6     Has slowed down on smoking - did not like NRT  1 mild/day                 Sarath Nogueira MD 12/20/24 9:29 AM

## 2024-12-20 NOTE — LETTER
December 20, 2024     Patient: Dayami Major   YOB: 1980   Date of Visit: 12/20/2024       To Whom It May Concern:    Dayami Major will be undergoing a surgical procedure on January 3, 2025. It is my medical opinion that she should be excused from all work duties through at least January 17, 2025.     If you have any questions or concerns, please don't hesitate to call.         Sincerely,        Sarath Nogueira MD    CC: No Recipients

## 2024-12-20 NOTE — ASSESSMENT & PLAN NOTE
Preoperative Planning:   - Proceed with vNOTES hysterectomy on 1/3/24.   The risks, benefits, and alternatives to the procedure were discussed.   The common risks of bleeding, infection, injury to bowel, bladder, ureter, urethra, vagina, recurrence of prolapse, and incomplete bladder emptying were reviewed.  I explained that while we pay close attention to positioning prior to starting the procedure the positioning may result in hip or lower extremity discomfort and in rare cases lower extremity nerve injury. All of these risks were reviewed and questions were answered. Ms. Major accepted these risks and desires to proceed with the surgery.   - Consent forms were signed today.   - Pre-anesthesia visit not indicated  - Antibiotic prophylaxis: Preperation with Chlorhexidine prep.  Ancef 2 grams with Flagyl 500 mg    Surgical considerations:  Preop VTE PPx:  SCDs  BMI:  Body mass index is 35.96 kg/m².   Home medications reviewed:  none   Anticoagulated:  none  Diabetic:  10/2024 A1C 5.1  Anemia:  11.5  Cardiac disease/EKG/Echo:  none  Pulmomary disease/CXR/CPAP:  none  Regular narcotic use / consideration for TAP/epidural:  no  Chronic kidney disease: none  Smoker:  decreased to 1 black n mild per day, continues to wean    Substance use:  none  Immunosuppression:   none  Mobility/orthopedic limitations:  none  Chronic steroid use:  none  Accepts Blood products: yes

## 2025-01-02 ENCOUNTER — LAB (OUTPATIENT)
Dept: LAB | Facility: LAB | Age: 45
End: 2025-01-02
Payer: COMMERCIAL

## 2025-01-02 ENCOUNTER — ANESTHESIA EVENT (OUTPATIENT)
Dept: OPERATING ROOM | Facility: HOSPITAL | Age: 45
End: 2025-01-02
Payer: COMMERCIAL

## 2025-01-02 DIAGNOSIS — N92.0 MENORRHAGIA WITH REGULAR CYCLE: ICD-10-CM

## 2025-01-02 LAB
ABO GROUP (TYPE) IN BLOOD: NORMAL
ANION GAP SERPL CALC-SCNC: 13 MMOL/L (ref 10–20)
ANTIBODY SCREEN: NORMAL
BUN SERPL-MCNC: 10 MG/DL (ref 6–23)
CALCIUM SERPL-MCNC: 8.9 MG/DL (ref 8.6–10.6)
CHLORIDE SERPL-SCNC: 104 MMOL/L (ref 98–107)
CO2 SERPL-SCNC: 27 MMOL/L (ref 21–32)
CREAT SERPL-MCNC: 0.66 MG/DL (ref 0.5–1.05)
EGFRCR SERPLBLD CKD-EPI 2021: >90 ML/MIN/1.73M*2
ERYTHROCYTE [DISTWIDTH] IN BLOOD BY AUTOMATED COUNT: 15.9 % (ref 11.5–14.5)
GLUCOSE SERPL-MCNC: 82 MG/DL (ref 74–99)
HCT VFR BLD AUTO: 36.2 % (ref 36–46)
HGB BLD-MCNC: 11.2 G/DL (ref 12–16)
MCH RBC QN AUTO: 25.5 PG (ref 26–34)
MCHC RBC AUTO-ENTMCNC: 30.9 G/DL (ref 32–36)
MCV RBC AUTO: 82 FL (ref 80–100)
NRBC BLD-RTO: 0 /100 WBCS (ref 0–0)
PLATELET # BLD AUTO: 308 X10*3/UL (ref 150–450)
POTASSIUM SERPL-SCNC: 4.6 MMOL/L (ref 3.5–5.3)
RBC # BLD AUTO: 4.4 X10*6/UL (ref 4–5.2)
RH FACTOR (ANTIGEN D): NORMAL
SODIUM SERPL-SCNC: 139 MMOL/L (ref 136–145)
WBC # BLD AUTO: 8.8 X10*3/UL (ref 4.4–11.3)

## 2025-01-02 PROCEDURE — 86850 RBC ANTIBODY SCREEN: CPT

## 2025-01-02 PROCEDURE — 85027 COMPLETE CBC AUTOMATED: CPT

## 2025-01-02 PROCEDURE — 86901 BLOOD TYPING SEROLOGIC RH(D): CPT

## 2025-01-02 PROCEDURE — 86900 BLOOD TYPING SEROLOGIC ABO: CPT

## 2025-01-02 PROCEDURE — 80048 BASIC METABOLIC PNL TOTAL CA: CPT

## 2025-01-03 ENCOUNTER — APPOINTMENT (OUTPATIENT)
Dept: OBSTETRICS AND GYNECOLOGY | Facility: CLINIC | Age: 45
End: 2025-01-03
Payer: COMMERCIAL

## 2025-01-03 ENCOUNTER — HOSPITAL ENCOUNTER (OUTPATIENT)
Facility: HOSPITAL | Age: 45
Setting detail: OUTPATIENT SURGERY
Discharge: HOME | End: 2025-01-03
Attending: STUDENT IN AN ORGANIZED HEALTH CARE EDUCATION/TRAINING PROGRAM | Admitting: STUDENT IN AN ORGANIZED HEALTH CARE EDUCATION/TRAINING PROGRAM
Payer: COMMERCIAL

## 2025-01-03 ENCOUNTER — ANESTHESIA (OUTPATIENT)
Dept: OPERATING ROOM | Facility: HOSPITAL | Age: 45
End: 2025-01-03
Payer: COMMERCIAL

## 2025-01-03 VITALS
RESPIRATION RATE: 16 BRPM | WEIGHT: 188.93 LBS | OXYGEN SATURATION: 96 % | HEART RATE: 80 BPM | DIASTOLIC BLOOD PRESSURE: 82 MMHG | BODY MASS INDEX: 35.67 KG/M2 | TEMPERATURE: 98.2 F | HEIGHT: 61 IN | SYSTOLIC BLOOD PRESSURE: 119 MMHG

## 2025-01-03 DIAGNOSIS — N92.0 MENORRHAGIA WITH REGULAR CYCLE: ICD-10-CM

## 2025-01-03 DIAGNOSIS — Z71.6 ENCOUNTER FOR TOBACCO USE CESSATION COUNSELING: Primary | ICD-10-CM

## 2025-01-03 LAB
ABO GROUP (TYPE) IN BLOOD: NORMAL
PREGNANCY TEST URINE, POC: NEGATIVE
RH FACTOR (ANTIGEN D): NORMAL

## 2025-01-03 PROCEDURE — 88307 TISSUE EXAM BY PATHOLOGIST: CPT | Mod: TC,SUR | Performed by: STUDENT IN AN ORGANIZED HEALTH CARE EDUCATION/TRAINING PROGRAM

## 2025-01-03 PROCEDURE — 36415 COLL VENOUS BLD VENIPUNCTURE: CPT | Performed by: ANESTHESIOLOGY

## 2025-01-03 PROCEDURE — 3700000002 HC GENERAL ANESTHESIA TIME - EACH INCREMENTAL 1 MINUTE: Performed by: STUDENT IN AN ORGANIZED HEALTH CARE EDUCATION/TRAINING PROGRAM

## 2025-01-03 PROCEDURE — 2500000005 HC RX 250 GENERAL PHARMACY W/O HCPCS: Mod: SE | Performed by: STUDENT IN AN ORGANIZED HEALTH CARE EDUCATION/TRAINING PROGRAM

## 2025-01-03 PROCEDURE — 2500000005 HC RX 250 GENERAL PHARMACY W/O HCPCS: Mod: SE | Performed by: ANESTHESIOLOGY

## 2025-01-03 PROCEDURE — 2500000004 HC RX 250 GENERAL PHARMACY W/ HCPCS (ALT 636 FOR OP/ED): Mod: SE | Performed by: ANESTHESIOLOGY

## 2025-01-03 PROCEDURE — P9045 ALBUMIN (HUMAN), 5%, 250 ML: HCPCS | Mod: JZ,SE,TB

## 2025-01-03 PROCEDURE — 2500000005 HC RX 250 GENERAL PHARMACY W/O HCPCS: Mod: SE

## 2025-01-03 PROCEDURE — 7100000010 HC PHASE TWO TIME - EACH INCREMENTAL 1 MINUTE: Performed by: STUDENT IN AN ORGANIZED HEALTH CARE EDUCATION/TRAINING PROGRAM

## 2025-01-03 PROCEDURE — 2500000001 HC RX 250 WO HCPCS SELF ADMINISTERED DRUGS (ALT 637 FOR MEDICARE OP): Mod: SE | Performed by: ANESTHESIOLOGY

## 2025-01-03 PROCEDURE — 3600000004 HC OR TIME - INITIAL BASE CHARGE - PROCEDURE LEVEL FOUR: Performed by: STUDENT IN AN ORGANIZED HEALTH CARE EDUCATION/TRAINING PROGRAM

## 2025-01-03 PROCEDURE — 2500000004 HC RX 250 GENERAL PHARMACY W/ HCPCS (ALT 636 FOR OP/ED): Mod: SE | Performed by: STUDENT IN AN ORGANIZED HEALTH CARE EDUCATION/TRAINING PROGRAM

## 2025-01-03 PROCEDURE — 2720000007 HC OR 272 NO HCPCS: Performed by: STUDENT IN AN ORGANIZED HEALTH CARE EDUCATION/TRAINING PROGRAM

## 2025-01-03 PROCEDURE — 7100000001 HC RECOVERY ROOM TIME - INITIAL BASE CHARGE: Performed by: STUDENT IN AN ORGANIZED HEALTH CARE EDUCATION/TRAINING PROGRAM

## 2025-01-03 PROCEDURE — 81025 URINE PREGNANCY TEST: CPT | Performed by: STUDENT IN AN ORGANIZED HEALTH CARE EDUCATION/TRAINING PROGRAM

## 2025-01-03 PROCEDURE — 2500000001 HC RX 250 WO HCPCS SELF ADMINISTERED DRUGS (ALT 637 FOR MEDICARE OP): Mod: SE | Performed by: STUDENT IN AN ORGANIZED HEALTH CARE EDUCATION/TRAINING PROGRAM

## 2025-01-03 PROCEDURE — 2500000004 HC RX 250 GENERAL PHARMACY W/ HCPCS (ALT 636 FOR OP/ED): Mod: SE

## 2025-01-03 PROCEDURE — 7100000002 HC RECOVERY ROOM TIME - EACH INCREMENTAL 1 MINUTE: Performed by: STUDENT IN AN ORGANIZED HEALTH CARE EDUCATION/TRAINING PROGRAM

## 2025-01-03 PROCEDURE — 58550 LAPARO-ASST VAG HYSTERECTOMY: CPT | Performed by: STUDENT IN AN ORGANIZED HEALTH CARE EDUCATION/TRAINING PROGRAM

## 2025-01-03 PROCEDURE — 7100000009 HC PHASE TWO TIME - INITIAL BASE CHARGE: Performed by: STUDENT IN AN ORGANIZED HEALTH CARE EDUCATION/TRAINING PROGRAM

## 2025-01-03 PROCEDURE — 3600000009 HC OR TIME - EACH INCREMENTAL 1 MINUTE - PROCEDURE LEVEL FOUR: Performed by: STUDENT IN AN ORGANIZED HEALTH CARE EDUCATION/TRAINING PROGRAM

## 2025-01-03 PROCEDURE — 3700000001 HC GENERAL ANESTHESIA TIME - INITIAL BASE CHARGE: Performed by: STUDENT IN AN ORGANIZED HEALTH CARE EDUCATION/TRAINING PROGRAM

## 2025-01-03 RX ORDER — MIDAZOLAM HYDROCHLORIDE 1 MG/ML
INJECTION INTRAMUSCULAR; INTRAVENOUS AS NEEDED
Status: DISCONTINUED | OUTPATIENT
Start: 2025-01-03 | End: 2025-01-03

## 2025-01-03 RX ORDER — ACETAMINOPHEN 325 MG/1
650 TABLET ORAL EVERY 4 HOURS PRN
Status: DISCONTINUED | OUTPATIENT
Start: 2025-01-03 | End: 2025-01-03 | Stop reason: HOSPADM

## 2025-01-03 RX ORDER — NORETHINDRONE AND ETHINYL ESTRADIOL 0.5-0.035
KIT ORAL AS NEEDED
Status: DISCONTINUED | OUTPATIENT
Start: 2025-01-03 | End: 2025-01-03

## 2025-01-03 RX ORDER — OXYCODONE AND ACETAMINOPHEN 5; 325 MG/1; MG/1
1 TABLET ORAL EVERY 4 HOURS PRN
Status: DISCONTINUED | OUTPATIENT
Start: 2025-01-03 | End: 2025-01-03 | Stop reason: HOSPADM

## 2025-01-03 RX ORDER — PROPOFOL 10 MG/ML
INJECTION, EMULSION INTRAVENOUS AS NEEDED
Status: DISCONTINUED | OUTPATIENT
Start: 2025-01-03 | End: 2025-01-03

## 2025-01-03 RX ORDER — ALBUMIN HUMAN 50 G/1000ML
SOLUTION INTRAVENOUS AS NEEDED
Status: DISCONTINUED | OUTPATIENT
Start: 2025-01-03 | End: 2025-01-03

## 2025-01-03 RX ORDER — SODIUM CHLORIDE, SODIUM LACTATE, POTASSIUM CHLORIDE, CALCIUM CHLORIDE 600; 310; 30; 20 MG/100ML; MG/100ML; MG/100ML; MG/100ML
INJECTION, SOLUTION INTRAVENOUS CONTINUOUS PRN
Status: DISCONTINUED | OUTPATIENT
Start: 2025-01-03 | End: 2025-01-03

## 2025-01-03 RX ORDER — CELECOXIB 200 MG/1
400 CAPSULE ORAL ONCE
Status: COMPLETED | OUTPATIENT
Start: 2025-01-03 | End: 2025-01-03

## 2025-01-03 RX ORDER — SODIUM CHLORIDE, SODIUM LACTATE, POTASSIUM CHLORIDE, CALCIUM CHLORIDE 600; 310; 30; 20 MG/100ML; MG/100ML; MG/100ML; MG/100ML
100 INJECTION, SOLUTION INTRAVENOUS CONTINUOUS
Status: DISCONTINUED | OUTPATIENT
Start: 2025-01-03 | End: 2025-01-03 | Stop reason: HOSPADM

## 2025-01-03 RX ORDER — METOCLOPRAMIDE HYDROCHLORIDE 5 MG/ML
10 INJECTION INTRAMUSCULAR; INTRAVENOUS ONCE AS NEEDED
Status: COMPLETED | OUTPATIENT
Start: 2025-01-03 | End: 2025-01-03

## 2025-01-03 RX ORDER — LIDOCAINE HCL/PF 100 MG/5ML
SYRINGE (ML) INTRAVENOUS AS NEEDED
Status: DISCONTINUED | OUTPATIENT
Start: 2025-01-03 | End: 2025-01-03

## 2025-01-03 RX ORDER — KETOROLAC TROMETHAMINE 30 MG/ML
INJECTION, SOLUTION INTRAMUSCULAR; INTRAVENOUS AS NEEDED
Status: DISCONTINUED | OUTPATIENT
Start: 2025-01-03 | End: 2025-01-03

## 2025-01-03 RX ORDER — OXYCODONE HYDROCHLORIDE 5 MG/1
10 TABLET ORAL EVERY 4 HOURS PRN
Status: DISCONTINUED | OUTPATIENT
Start: 2025-01-03 | End: 2025-01-03 | Stop reason: HOSPADM

## 2025-01-03 RX ORDER — PHENYLEPHRINE HCL IN 0.9% NACL 0.4MG/10ML
SYRINGE (ML) INTRAVENOUS AS NEEDED
Status: DISCONTINUED | OUTPATIENT
Start: 2025-01-03 | End: 2025-01-03

## 2025-01-03 RX ORDER — LIDOCAINE HYDROCHLORIDE 10 MG/ML
0.1 INJECTION, SOLUTION EPIDURAL; INFILTRATION; INTRACAUDAL; PERINEURAL ONCE
Status: DISCONTINUED | OUTPATIENT
Start: 2025-01-03 | End: 2025-01-03 | Stop reason: HOSPADM

## 2025-01-03 RX ORDER — FENTANYL CITRATE 50 UG/ML
INJECTION, SOLUTION INTRAMUSCULAR; INTRAVENOUS AS NEEDED
Status: DISCONTINUED | OUTPATIENT
Start: 2025-01-03 | End: 2025-01-03

## 2025-01-03 RX ORDER — DROPERIDOL 2.5 MG/ML
0.62 INJECTION, SOLUTION INTRAMUSCULAR; INTRAVENOUS ONCE AS NEEDED
Status: DISCONTINUED | OUTPATIENT
Start: 2025-01-03 | End: 2025-01-03 | Stop reason: HOSPADM

## 2025-01-03 RX ORDER — HYDROMORPHONE HYDROCHLORIDE 0.2 MG/ML
0.2 INJECTION INTRAMUSCULAR; INTRAVENOUS; SUBCUTANEOUS EVERY 5 MIN PRN
Status: DISCONTINUED | OUTPATIENT
Start: 2025-01-03 | End: 2025-01-03 | Stop reason: HOSPADM

## 2025-01-03 RX ORDER — LIDOCAINE HYDROCHLORIDE AND EPINEPHRINE 10; 10 UG/ML; MG/ML
INJECTION, SOLUTION INFILTRATION; PERINEURAL AS NEEDED
Status: DISCONTINUED | OUTPATIENT
Start: 2025-01-03 | End: 2025-01-03 | Stop reason: HOSPADM

## 2025-01-03 RX ORDER — ROCURONIUM BROMIDE 10 MG/ML
INJECTION, SOLUTION INTRAVENOUS AS NEEDED
Status: DISCONTINUED | OUTPATIENT
Start: 2025-01-03 | End: 2025-01-03

## 2025-01-03 RX ORDER — CEFAZOLIN 1 G/1
INJECTION, POWDER, FOR SOLUTION INTRAVENOUS AS NEEDED
Status: DISCONTINUED | OUTPATIENT
Start: 2025-01-03 | End: 2025-01-03

## 2025-01-03 RX ORDER — METRONIDAZOLE 500 MG/100ML
INJECTION, SOLUTION INTRAVENOUS AS NEEDED
Status: DISCONTINUED | OUTPATIENT
Start: 2025-01-03 | End: 2025-01-03

## 2025-01-03 RX ORDER — ACETAMINOPHEN 325 MG/1
975 TABLET ORAL ONCE
Status: COMPLETED | OUTPATIENT
Start: 2025-01-03 | End: 2025-01-03

## 2025-01-03 RX ORDER — GABAPENTIN 600 MG/1
600 TABLET ORAL ONCE
Status: COMPLETED | OUTPATIENT
Start: 2025-01-03 | End: 2025-01-03

## 2025-01-03 RX ORDER — SODIUM CHLORIDE 0.9 G/100ML
IRRIGANT IRRIGATION AS NEEDED
Status: DISCONTINUED | OUTPATIENT
Start: 2025-01-03 | End: 2025-01-03 | Stop reason: HOSPADM

## 2025-01-03 RX ORDER — HYDROMORPHONE HYDROCHLORIDE 1 MG/ML
INJECTION, SOLUTION INTRAMUSCULAR; INTRAVENOUS; SUBCUTANEOUS AS NEEDED
Status: DISCONTINUED | OUTPATIENT
Start: 2025-01-03 | End: 2025-01-03

## 2025-01-03 RX ORDER — ONDANSETRON HYDROCHLORIDE 2 MG/ML
INJECTION, SOLUTION INTRAVENOUS AS NEEDED
Status: DISCONTINUED | OUTPATIENT
Start: 2025-01-03 | End: 2025-01-03

## 2025-01-03 RX ORDER — LABETALOL HYDROCHLORIDE 5 MG/ML
5 INJECTION, SOLUTION INTRAVENOUS ONCE AS NEEDED
Status: DISCONTINUED | OUTPATIENT
Start: 2025-01-03 | End: 2025-01-03 | Stop reason: HOSPADM

## 2025-01-03 RX ORDER — WATER 1 ML/ML
IRRIGANT IRRIGATION AS NEEDED
Status: DISCONTINUED | OUTPATIENT
Start: 2025-01-03 | End: 2025-01-03 | Stop reason: HOSPADM

## 2025-01-03 RX ADMIN — Medication 80 MCG: at 07:25

## 2025-01-03 RX ADMIN — KETOROLAC TROMETHAMINE 30 MG: 30 INJECTION, SOLUTION INTRAMUSCULAR; INTRAVENOUS at 09:51

## 2025-01-03 RX ADMIN — Medication 120 MCG: at 07:53

## 2025-01-03 RX ADMIN — ALBUMIN HUMAN 250 ML: 0.05 INJECTION, SOLUTION INTRAVENOUS at 09:25

## 2025-01-03 RX ADMIN — LIDOCAINE HYDROCHLORIDE 100 MG: 20 INJECTION INTRAVENOUS at 07:17

## 2025-01-03 RX ADMIN — HYDROMORPHONE HYDROCHLORIDE 0.5 MG: 1 INJECTION, SOLUTION INTRAMUSCULAR; INTRAVENOUS; SUBCUTANEOUS at 10:02

## 2025-01-03 RX ADMIN — SUGAMMADEX 200 MG: 100 INJECTION, SOLUTION INTRAVENOUS at 09:52

## 2025-01-03 RX ADMIN — HYDROMORPHONE HYDROCHLORIDE 0.5 MG: 1 INJECTION, SOLUTION INTRAMUSCULAR; INTRAVENOUS; SUBCUTANEOUS at 10:19

## 2025-01-03 RX ADMIN — Medication 2 L/MIN: at 10:23

## 2025-01-03 RX ADMIN — ROCURONIUM BROMIDE 10 MG: 10 INJECTION INTRAVENOUS at 09:18

## 2025-01-03 RX ADMIN — OXYCODONE HYDROCHLORIDE 10 MG: 5 TABLET ORAL at 10:39

## 2025-01-03 RX ADMIN — METRONIDAZOLE 500 MG: 5 INJECTION, SOLUTION INTRAVENOUS at 07:28

## 2025-01-03 RX ADMIN — Medication 6 L/MIN: at 10:00

## 2025-01-03 RX ADMIN — ACETAMINOPHEN 975 MG: 325 TABLET ORAL at 06:30

## 2025-01-03 RX ADMIN — CELECOXIB 400 MG: 200 CAPSULE ORAL at 06:30

## 2025-01-03 RX ADMIN — FENTANYL CITRATE 100 MCG: 50 INJECTION, SOLUTION INTRAMUSCULAR; INTRAVENOUS at 07:17

## 2025-01-03 RX ADMIN — CEFAZOLIN 2 G: 1 INJECTION, POWDER, FOR SOLUTION INTRAMUSCULAR; INTRAVENOUS at 07:17

## 2025-01-03 RX ADMIN — SODIUM CHLORIDE, POTASSIUM CHLORIDE, SODIUM LACTATE AND CALCIUM CHLORIDE: 600; 310; 30; 20 INJECTION, SOLUTION INTRAVENOUS at 08:56

## 2025-01-03 RX ADMIN — PROPOFOL 100 MG: 10 INJECTION, EMULSION INTRAVENOUS at 07:17

## 2025-01-03 RX ADMIN — Medication 120 MCG: at 07:38

## 2025-01-03 RX ADMIN — HYDROMORPHONE HYDROCHLORIDE 0.5 MG: 1 INJECTION, SOLUTION INTRAMUSCULAR; INTRAVENOUS; SUBCUTANEOUS at 08:51

## 2025-01-03 RX ADMIN — ROCURONIUM BROMIDE 20 MG: 10 INJECTION INTRAVENOUS at 08:46

## 2025-01-03 RX ADMIN — Medication 80 MCG: at 07:33

## 2025-01-03 RX ADMIN — DEXAMETHASONE SODIUM PHOSPHATE 8 MG: 4 INJECTION INTRA-ARTICULAR; INTRALESIONAL; INTRAMUSCULAR; INTRAVENOUS; SOFT TISSUE at 07:33

## 2025-01-03 RX ADMIN — ROCURONIUM BROMIDE 70 MG: 10 INJECTION INTRAVENOUS at 07:17

## 2025-01-03 RX ADMIN — SODIUM CHLORIDE, POTASSIUM CHLORIDE, SODIUM LACTATE AND CALCIUM CHLORIDE: 600; 310; 30; 20 INJECTION, SOLUTION INTRAVENOUS at 07:12

## 2025-01-03 RX ADMIN — METOCLOPRAMIDE HYDROCHLORIDE 10 MG: 5 INJECTION INTRAMUSCULAR; INTRAVENOUS at 10:21

## 2025-01-03 RX ADMIN — ALBUMIN HUMAN 250 ML: 0.05 INJECTION, SOLUTION INTRAVENOUS at 07:52

## 2025-01-03 RX ADMIN — GABAPENTIN 600 MG: 600 TABLET, FILM COATED ORAL at 06:30

## 2025-01-03 RX ADMIN — ONDANSETRON 4 MG: 2 INJECTION INTRAMUSCULAR; INTRAVENOUS at 09:43

## 2025-01-03 RX ADMIN — ROCURONIUM BROMIDE 10 MG: 10 INJECTION INTRAVENOUS at 08:22

## 2025-01-03 RX ADMIN — MIDAZOLAM HYDROCHLORIDE 2 MG: 1 INJECTION, SOLUTION INTRAMUSCULAR; INTRAVENOUS at 07:09

## 2025-01-03 RX ADMIN — EPHEDRINE SULFATE 5 MG: 50 INJECTION, SOLUTION INTRAVENOUS at 07:59

## 2025-01-03 ASSESSMENT — PAIN SCALES - GENERAL
PAINLEVEL_OUTOF10: 8
PAINLEVEL_OUTOF10: 3
PAINLEVEL_OUTOF10: 7
PAINLEVEL_OUTOF10: 3
PAINLEVEL_OUTOF10: 0 - NO PAIN
PAINLEVEL_OUTOF10: 4

## 2025-01-03 ASSESSMENT — PAIN - FUNCTIONAL ASSESSMENT
PAIN_FUNCTIONAL_ASSESSMENT: 0-10
PAIN_FUNCTIONAL_ASSESSMENT: UNABLE TO SELF-REPORT
PAIN_FUNCTIONAL_ASSESSMENT: 0-10

## 2025-01-03 ASSESSMENT — COLUMBIA-SUICIDE SEVERITY RATING SCALE - C-SSRS
2. HAVE YOU ACTUALLY HAD ANY THOUGHTS OF KILLING YOURSELF?: NO
6. HAVE YOU EVER DONE ANYTHING, STARTED TO DO ANYTHING, OR PREPARED TO DO ANYTHING TO END YOUR LIFE?: NO
1. IN THE PAST MONTH, HAVE YOU WISHED YOU WERE DEAD OR WISHED YOU COULD GO TO SLEEP AND NOT WAKE UP?: NO

## 2025-01-03 NOTE — ANESTHESIA POSTPROCEDURE EVALUATION
Patient: Dayami Major    Procedure Summary       Date: 01/03/25 Room / Location: [facility] OR 04 / Virtual [facility] OR    Anesthesia Start: 0712 Anesthesia Stop: 1004    Procedures:       vNOTES hysterectomy (Bilateral)      CYSTOSCOPY, RIGID Diagnosis:       Menorrhagia with regular cycle      (Menorrhagia with regular cycle [N92.0])    Surgeons: Sarath Nogueira MD Responsible Provider: Nathanael Melo MD    Anesthesia Type: general ASA Status: 2            Anesthesia Type: general    Vitals Value Taken Time   /69 01/03/25 1000   Temp 36.1 °C (97 °F) 01/03/25 1000   Pulse 92 01/03/25 1000   Resp 14 01/03/25 1000   SpO2 100 % 01/03/25 1000   Vitals shown include unfiled device data.    Anesthesia Post Evaluation    Patient location during evaluation: PACU  Patient participation: complete - patient participated  Level of consciousness: awake and alert  Pain management: adequate  Airway patency: patent  Cardiovascular status: hemodynamically stable and acceptable  Respiratory status: face mask and spontaneous ventilation  Hydration status: acceptable  Postoperative Nausea and Vomiting: none        There were no known notable events for this encounter.

## 2025-01-03 NOTE — OP NOTE
Date: 1/3/2025  OR Location: [facility] OR    Name: Dayami Major, : 1980, Age: 44 y.o., MRN: [id number], Sex: female    Diagnosis  Pre-op Diagnosis      * Menorrhagia with regular cycle [N92.0] Post-op Diagnosis     * Menorrhagia with regular cycle [N92.0]     Procedures  vNOTES hysterectomy  74499 - TX LAPS VAGINAL HYSTERECTOMY UTERUS 250 GM/<    CYSTOSCOPY, RIGID  22092 - TX CYSTOURETHROSCOPY      Surgeons      * Sarath Nogueira - Primary    Resident/Fellow/Other Assistant:  Chuyita Merritt MD PGY3    Staff:   Scrub Person: Vicky  Circulator: Maribel  Circulator: Mone    Anesthesia Staff: Anesthesiologist: Nathanael Melo MD  CRNA: JOSE R Mccollum-CRNA  C-AA: GEORGES Stevenson  DEVORA: Nadia Natarajan    Procedure Summary  Anesthesia: General  ASA: II  Estimated Blood Loss: 100mL  Intra-op Medications:   Administrations occurring from 0645 to 1030 on 25:   Medication Name Total Dose   sodium chloride 0.9 % irrigation solution 2,000 mL   sterile water irrigation solution 1,000 mL   lidocaine-epinephrine (Xylocaine W/EPI) 1 %-1:100,000 injection 20 mL   HYDROmorphone (Dilaudid) injection 0.5 mg 0.5 mg   oxygen (O2) therapy 152 L   metoclopramide (Reglan) injection 10 mg 10 mg   albumin human bottle 5% 500 mL   ceFAZolin (Ancef) vial 1 g 2 g   dexAMETHasone (Decadron) injection 4 mg/mL 8 mg   ePHEDrine injection 5 mg   fentaNYL (Sublimaze) injection 50 mcg/mL 100 mcg   HYDROmorphone (Dilaudid) injection 1 mg/mL 1 mg   ketorolac (Toradol) injection 30 mg 30 mg   lactated Ringer's infusion Cannot be calculated   lidocaine (cardiac) injection 2% prefilled syringe 100 mg   metroNIDAZOLE (Flagyl)  mg in 100 mL NaCl (iso) - premix 500 mg   midazolam PF (Versed) injection 1 mg/mL 2 mg   ondansetron (Zofran) 2 mg/mL injection 4 mg   phenylephrine 40 mcg/mL syringe 10 mL 400 mcg   propofol (Diprivan) injection 10 mg/mL 100 mg   rocuronium (ZeMuron) 50 mg/5 mL injection 110 mg   sugammadex (Bridion)  200 mg/2 mL injection 200 mg              Anesthesia Record               Intraprocedure I/O Totals          Intake    lactated Ringer's 2500.00 mL    albumin human bottle 5% 500.00 mL    metroNIDAZOLE 500 mg/100 mL 100.00 mL    Total Intake 3100 mL       Output    Urine 95 mL    Total Blood Loss - Surgical Delivery (mL) 100 mL    Total Output 195 mL       Net    Net Volume 2905 mL       Other (could not be determined as input or output)    Surgical Delivery Estimated Blood Loss (mL) 100          Specimen:   ID Type Source Tests Collected by Time   1 : UTERUS AND CERVIX Tissue UTERUS WITH CERVIX SURGICAL PATHOLOGY EXAM Sarath Nogueira MD 1/3/2025 0925                 Procedure Details:  The patient was seen in the preoperative area. The site of surgery was properly noted/marked if necessary per policy. The patient has been actively warmed in preoperative area. Preoperative antibiotics have been ordered and given within 1 hours of incision. Venous thrombosis prophylaxis have been ordered including bilateral sequential compression devices    Findings: Normal appearing external female genitalia. Vaginal mucosa normal appearing without lesions. Cervix parous without lesions. Normal appearing bilateral ovaries with surgically absent tubes. Dilated retroperitoneal venous network in the pelvis suggestive of pelvic congestion syndrome. Normal upper abdominal survey, no evidence of intra-abdominal adhesive disease. Cystoscopy with normal appearing bladder dome and trigone with no evidence of trauma. Brisk bilateral ureteral efflux visualized.    Description of Procedure:      After a satisfactory level of general anesthesia was achieved, the patient was placed in high lithotomy position in the Brando stirrups with SCD stockings on and functioning. Preoperative antibiotics had been given. A time out was performed. The vulva and vagina were widely prepped with Chlorhexidine, and abdomen with Chloraprep, and draped out  with sterile drapes.      Doyen retractors were placed anteriorly and posteriorly. The cervix was grasped using 2 single tooth tenaculum. The mucosa at the cervicovaginal junction was infiltrated with approximately 20cc of 1% lidocaine with epinephrine and incised with the scalpel circumferentially at the cervicovaginal junction. The bladder was pushed superiorly with blunt dissection. The posterior cul de sac was entered sharply with scissors. No adhesions were noted in the posterior cul de sac, incision was enlarged with blunt dissection, and the posterior peritoneum was tagged to the posterior vagina and held for later use.  Doyen retractor was placed intraperitoneally. Attention was again turned to the anterior aspect of the vagina, where the cervix was dissected off of the pubocervical fascia bluntly. The uterosacral ligaments were now clamped, cut, doubly suture ligated, and tagged for later use. The anterior peritoneum was then identified, elevated with pickups, and entered without incident using Metzenbaum scissors. The Doyen retractor was placed in the anterior cul de sac.      Next, the 9cm vaginal Nnamdi retractor was introduced into the anterior peritoneum and posterior peritoneum. Tenaculum were removed and VNOTES gel port was assembled and attached to the Nnamdi retractor, and insufflation was activated to create pneumoperitoneum. Laparoscope and instrumentation was introduced with excellent visualization. To remove bowel from pelvis, patient was placed in Trendelenburg. The left cardinal and broad ligament was taken down in a stepwise fashion using the Ligasure device. Dissection was continued to the level of the round ligament which was left intact. Attention was then turned to the right cardinal and broad ligaments which were dissected in a similar fashion. The remainder of the right broad ligament and round ligament were then taken down with the ligasure device, freeing the right side of the  uterus. The remainder of the left broad and round ligaments were then transected using Ligasure device, completely freeing the uterus which was then removed through the vagina. Inspection of all pedicles revealed excellent hemostasis. Gel port was removed, specimens retrieved, and insufflation evacuated. Retractor was removed. Using a 0 vicryl in a running locked fashion, the posterior peritoneum was attached to the posterior vaginal epithelium.      Next, an external Patel's suture using 0-PDS was placed through the posterior cul de sac and peritoneum at 6:00, through the left uterosacral ligament, across the posterior cul de sac, through the right uterosacral ligament, and then through the posterior peritoneum at 6:00. Pedicles were again examined and hemostasis confirmed. Patel's suture was held. The vaginal cuff was then closed with 3 figure-of-eight 0-vicryl stitches incorporating and burying the uterosacral ligaments to completely close the colpotomy. The suture was held.      The Gonzalez catheter was then removed and a cytoscopy was performed with previously held Patel's stitch on tension. Approximately 150cc of normal saline was used to distend the bladder. A global survey of the bladder was performed, revealing no injury to the bladder, no visible suture seen within the cavity, bilateral efflux of urine from bilateral ureteral orifices, and no visible trauma to the urethra upon removal of the cystoscope. Cystoscope was removed. Patel's stitch was tied and sutures were now trimmed and vaginal cuff palpated and confirmed to be closed.     At the end of the case, hemostasis was confirmed. Final sponge, needle, and instrument counts were correct per protocol. Ending time out was performed. The patient was extubated without difficulty and taken to the PACU in stable condition.     Complications:  None; patient tolerated the procedure well.     Disposition: PACU - hemodynamically stable.  Condition: stable

## 2025-01-03 NOTE — HOSPITAL COURSE
Gynecologic Surgery History and Physical    Subjective   Dayami Major is a 44 y.o. with AUB  presenting for vNOTES hysterectomy.    PSH: BTL  Imaging:   TVUS 2024  IMPRESSION:  1. Thickened and heterogenous myometrium, likely representing  adenomyosis.  2. Small uterine fibroid measuring 1.7 cm.  3. Complex 1.3 cm lesion in the right ovary is favored to represent a  hemorrhagic cyst.    Obstetrical History   OB History    Para Term  AB Living   4 3 3   1 3   SAB IAB Ectopic Multiple Live Births           3      # Outcome Date GA Lbr Jon/2nd Weight Sex Type Anes PTL Lv   4 Term      Vag-Spont   BREANA   3 Term      Vag-Spont   BREANA   2 Term      Vag-Spont   BREANA   1 AB                Past Medical History  No past medical history on file.     Past Surgical History   Past Surgical History:   Procedure Laterality Date    SALPINGECTOMY Bilateral 2018    Laparoscopic       Social History  Social History     Tobacco Use    Smoking status: Every Day     Types: Cigars    Smokeless tobacco: Never    Tobacco comments:     3 black n milds per day   Substance Use Topics    Alcohol use: Yes     Comment: social     Substance and Sexual Activity   Drug Use Never       Allergies  Shellfish containing products     Medications  No medications prior to admission.       ROS: negative except per HPI    Objective    Last Vitals  There were no vitals taken for this visit.    Physical Examination  General: no acute distress  HEENT: normocephalic, atraumatic  Heart: warm and well perfused  Lungs: breathing comfortably on room air  Abdomen: nondistended  Extremities: moving all extremities  Neuro: awake and conversant  Psych: appropriate mood and affect    Lab Review  Results from last 7 days   Lab Units 25  0910   HEMOGLOBIN g/dL 11.2*   HEMATOCRIT % 36.2   PLATELETS AUTO x10*3/uL 308   CREATININE mg/dL 0.66         Lab Results   Component Value Date    WBC 8.8 2025    HGB 11.2 (L) 2025    HCT 36.2 2025     MCV 82 01/02/2025     01/02/2025       Lab Results   Component Value Date    GLUCOSE 82 01/02/2025    CALCIUM 8.9 01/02/2025     01/02/2025    K 4.6 01/02/2025    CO2 27 01/02/2025     01/02/2025    BUN 10 01/02/2025    CREATININE 0.66 01/02/2025       Assessment/Plan     Dayami Major is a 44 y.o. with AUB presenting for scheduled surgery.    Plan to proceed with vNOTES hysterectomy   Surgical consent was reviewed. The risks of surgery were discussed including: bleeding (including need for blood transfusion in life-threatening situations; risks of transfusion), infection, damage to surrounding organs. The patient had the opportunity to answer questions and desired to proceed with surgery following our discussion. Both verbal and written consents were obtained.      Chuyita Merritt MD  PGY-3, Obstetrics and Gynecology

## 2025-01-03 NOTE — ANESTHESIA PREPROCEDURE EVALUATION
Patient: Dayami Major    Procedure Information       Anesthesia Start Date/Time: 01/03/25 0712    Procedures:       vNOTES hysterectomy (Bilateral)      CYSTOSCOPY, RIGID    Location: [facility] OR 04 / [facility] OR    Surgeons: Sarath Nogueira MD            Relevant Problems   /Renal   (+) Acute cystitis without hematuria      ID   (+) Bacterial vaginosis      GYN   (+) Menorrhagia with regular cycle       Clinical information reviewed:   Tobacco  Allergies  Meds   Med Hx  Surg Hx  OB Status  Fam Hx  Soc   Hx        NPO Detail:  NPO/Void Status  Carbohydrate Drink Given Prior to Surgery? : N  Date of Last Liquid: 01/03/25  Time of Last Liquid: 0630 (sips with meds)  Date of Last Solid: 01/02/25  Time of Last Solid: 2000  Last Intake Type: Clear fluids  Time of Last Void: 0630         Physical Exam    Airway  Mallampati: II  TM distance: >3 FB  Neck ROM: full     Cardiovascular - normal exam     Dental - normal exam     Pulmonary - normal exam     Abdominal            Anesthesia Plan    History of general anesthesia?: yes  History of complications of general anesthesia?: no    ASA 2     general     intravenous induction   Anesthetic plan and risks discussed with patient.    Plan discussed with CAA and attending.

## 2025-01-03 NOTE — DISCHARGE INSTRUCTIONS
Below you will find some general instructions for your care after your surgery. Please let me know if you have any questions. Myself or a partner can be reached 24-hours per day at [phone] ([place] Office) or [phone] ([place] Office).     Post-operative guidelines    Call the office if you:   run a fever greater than 100.5 degrees for more than 24 hours   experience vaginal bleeding greater in amount than a regular period (soaking more than 1 pad per hour x 2 hours)   have an unusual vaginal discharge   are persistently nauseated or have vomiting   have difficulty urinating   have increasing pain or pain that is not controlled by the pain medication provided to you      Pain Medication  We recommend that you alternate the use of ibuprofen 600 mg every 6 hours, with Tylenol 1000 mg every 6 hours.  Thus, you will take a set of pills every 3 hours. You can also take both tablets together at the same time every 6 hours if your prefer. That way you will always have some pain medication in your system providing relief.  After the first few days, you can begin to use these medications only as needed.   Use oxycodone 5 mg when pain becomes more intense.  You will notice more pain as you begin to be more active, which is normal.  However, note that oxycodone and other narcotics are very constipating.  If you do not require additional pain relief, it is best to avoid narcotic use.  Narcotic medication can cause nausea in some people.  If you experience nausea after using narcotics, consider taking 1 tablet of Zofran 30 minutes prior to taking oxycodone or another narcotic.  Keep in mind, anti-nausea medications are also constipating.  Some patients have shoulder or neck pain for a couple days after surgery. This is typically due to nerve pain caused by the gas we use to inflate the belly. Over the counter lidocaine patches can be used to help with this pain. The most common brand is SalonPas.      Constipation  If you are requiring narcotic medication, make sure you are using a stool softener or laxative.  Senokot.  Take 1 pill in the morning and 1 pill at night.  This is a gentle combination stool softener and laxative.  It does not work well unless you drink water.  If you are not having any movement with this dose, you can safely double the amount of Senokot you are taking to 2 pills in the morning and 2 pills at night.  After surgery, it is common to be constipated for several days.  Do not expect a bowel movement for 3-5 days after surgery.  If you have not yet had a bowel movement by day 4 after surgery you can consider taking over-the-counter MiraLax in the morning.  If this still has not produced a bowel movement, on day 5 you can consider adding to your regimen milk of magnesia.  Milk of magnesia is a very strong laxative.  It can cause quite a bit of stomach cramping.  It takes about 6-8 hours to work after you take the milk of magnesia.  If you are feeling “full” and close to a bowel movement, but just can't achieve a bowel movement, you might consider using a suppository or enema. These are also over-the-counter and helps evacuate the rectum.  If you are having significant discomfort from gas pain, you can take over-the-counter simethicone.  A common brand name is called Gas-X.    Activity  We encourage you to walk at least 30 minutes every day, broken up in to 10 minute intervals.  You may be walking slowly, but it is important to move your body after surgery.  Pelvic rest means nothing in the vagina, including no intercourse, tampons, soaking in hot tubs or the pool, douching.  If you have been advised to follow pelvic rest, please follow these guidelines. These restrictions last for 6 weeks.   If you have been advised not to lift greater than 10-20 lb after surgery, your exercise should only be limited to walking.  Lifting heavy items increases pressure on the pelvic floor.  Limited bending  and twisting is okay however is likely to be painful for you. If you have had a hysterectomy, these restrictions last for 6 weeks. If you have had a laparoscopic or robotic procedure (a procedure completed through small incisions) but not a hysterectomy, these restrictions last for 4 weeks.     Bleeding  If you have had hysterectomy, you may have spotting or light bleeding for 6-9 weeks after surgery.  You may notice that bleeding becomes a bit heavier as you become more active.  This is nothing to worry about.  However, if you begin bleeding briskly (requiring multiple pad changes in the day or soaking a full pad within 1-2 hours) please let the office know so you can be evaluated.  If you have had surgery that did not include hysterectomy, your menstrual cycle may be somewhat unpredictable for 1-3 cycles after surgery.  You may have your menstrual cycle immediately after surgery or may skip a full cycle.  This is also within the range of normal.    Dressings/incisions  If you have had a vaginal or VNOTES hysterectomy, your only incision is in the vagina.  If you have abdominal incisions, you will likely have small bandages called Steri-Strips on your smaller incisions.  They should fall off on their own within 7-14 days.  If they fall off sooner than 7 days, nothing to worry about.  If they are still on after 1 week, go ahead and remove the Steri-Strips at that point.  You can get the Steri-Strips wet in the shower.  Just do not scrub into the incisions.  Soap up the upper part of your abdomen and let the soapy water run down.  Once the Steri-Strips are off, continue to keep the incisions clean and dry.  There is dissolvable suture under the skin of your incisions.  It is not uncommon to notice small bits of suture coming free from the incisions after a few weeks.  Sometimes it is difficult to care for the belly button incision.  If you become concerned about the belly button incision or it is not easy to keep  clean, you can use a cotton ball soaked in hydrogen peroxide to gently wipe the belly button incision once a day.  Please call the office if you note rapidly enlarging redness around the belly button or thick white, opaque yellow or green discharge from the belly button.  Clear or straw-colored drainage can be present and is not concerning.  You can always send a photograph of your belly button incision through the patient portal if you are concerned about how it looks.  Because the belly button has the largest incision, we use more layers of suture in this area.  You may feel a hard knot under the skin of your belly button incision at the top and bottom of the incision.  This is most likely the suture knot of the bottom layer of suture under your belly button.  This area may be slightly tender, but should not be extremely painful.  These sutures will dissolve slowly over time (3 months).    Dr. Mirlande Taylor

## 2025-01-03 NOTE — INTERVAL H&P NOTE
Interval History and Physical    Patient Description:  Dayami Major is a 44 y.o.  with AUB presenting for vNOTES hysterectomy      H&P reviewed and no changes.  Plan to proceed with vNOTES hysterectomy   Surgical consent was reviewed. The risks of surgery were discussed including: bleeding (including need for blood transfusion in life-threatening situations; risks of transfusion), infection, damage to surrounding organs. The patient had the opportunity to answer questions and desired to proceed with surgery following our discussion. Both verbal and written consents were obtained  Seen and discussed with Dr. Mirlande Merritt MD PGY-3

## 2025-01-03 NOTE — ANESTHESIA PROCEDURE NOTES
Airway  Date/Time: 1/3/2025 7:20 AM  Urgency: elective    Airway not difficult    Staffing  Performed: DEVORA   Authorized by: Nathanael Melo MD    Performed by: GEORGES Stevenson  Patient location during procedure: OR    Indications and Patient Condition  Indications for airway management: anesthesia  Spontaneous Ventilation: absent  Sedation level: deep  Preoxygenated: yes  Patient position: sniffing  Mask difficulty assessment: 1 - vent by mask  Planned trial extubation    Final Airway Details  Final airway type: endotracheal airway      Successful airway: ETT  Cuffed: yes   Successful intubation technique: direct laryngoscopy  Facilitating devices/methods: intubating stylet  Endotracheal tube insertion site: oral  Blade: Nazanin  Blade size: #3  ETT size (mm): 7.0  Cormack-Lehane Classification: grade I - full view of glottis  Placement verified by: capnometry   Measured from: lips  ETT to lips (cm): 21  Number of attempts at approach: 1

## 2025-01-03 NOTE — ANESTHESIA PROCEDURE NOTES
Peripheral IV  Date/Time: 1/3/2025 7:24 AM  Inserted by: Nadia Delgado    Placement  Needle size: 20 G  Laterality: left  Location: wrist  Site prep: alcohol  Technique: anatomical landmarks  Attempts: 1

## 2025-01-31 ENCOUNTER — OFFICE VISIT (OUTPATIENT)
Dept: OBSTETRICS AND GYNECOLOGY | Facility: CLINIC | Age: 45
End: 2025-01-31
Payer: COMMERCIAL

## 2025-01-31 VITALS
DIASTOLIC BLOOD PRESSURE: 78 MMHG | HEART RATE: 82 BPM | SYSTOLIC BLOOD PRESSURE: 118 MMHG | BODY MASS INDEX: 34.65 KG/M2 | WEIGHT: 183.4 LBS

## 2025-01-31 DIAGNOSIS — R06.83 SNORING: Primary | ICD-10-CM

## 2025-01-31 DIAGNOSIS — N92.0 MENORRHAGIA WITH REGULAR CYCLE: ICD-10-CM

## 2025-01-31 PROBLEM — B96.89 BACTERIAL VAGINOSIS: Status: RESOLVED | Noted: 2024-07-26 | Resolved: 2025-01-31

## 2025-01-31 PROBLEM — N76.0 BACTERIAL VAGINOSIS: Status: RESOLVED | Noted: 2024-07-26 | Resolved: 2025-01-31

## 2025-01-31 PROBLEM — N30.00 ACUTE CYSTITIS WITHOUT HEMATURIA: Status: RESOLVED | Noted: 2024-07-16 | Resolved: 2025-01-31

## 2025-01-31 PROBLEM — R53.83 FATIGUE: Status: RESOLVED | Noted: 2023-10-19 | Resolved: 2025-01-31

## 2025-01-31 PROCEDURE — 99211 OFF/OP EST MAY X REQ PHY/QHP: CPT | Performed by: STUDENT IN AN ORGANIZED HEALTH CARE EDUCATION/TRAINING PROGRAM

## 2025-01-31 ASSESSMENT — ENCOUNTER SYMPTOMS
EYES NEGATIVE: 0
CARDIOVASCULAR NEGATIVE: 0
CONSTITUTIONAL NEGATIVE: 0
ALLERGIC/IMMUNOLOGIC NEGATIVE: 0
ENDOCRINE NEGATIVE: 0
NEUROLOGICAL NEGATIVE: 0
HEMATOLOGIC/LYMPHATIC NEGATIVE: 0
GASTROINTESTINAL NEGATIVE: 0
PSYCHIATRIC NEGATIVE: 0
MUSCULOSKELETAL NEGATIVE: 0
RESPIRATORY NEGATIVE: 0

## 2025-01-31 ASSESSMENT — PAIN SCALES - GENERAL: PAINLEVEL_OUTOF10: 0-NO PAIN

## 2025-01-31 NOTE — PROGRESS NOTES
Subjective   Patient ID: Dayami Major is a 44 y.o. female who presents for Post-op (Pt here for hysterectomy post op/Last pap-10-1-2021/Last mamm-10-23-24/Std-declined/Chaperone-accepted ).    S/p vNOTES hysterectomy on 1/3/25.    Doing well. Has not taken any pain medication in 3 weeks. Bowel movements daily, soft. Urinary habits normal. No vaginal discharge or bleeding. Wishes to return to work.     Reports she has been snoring more and waking herself up.    Objective   Physical Exam  Constitutional:       General: She is not in acute distress.     Appearance: Normal appearance.   HENT:      Head: Normocephalic.   Cardiovascular:      Rate and Rhythm: Normal rate.   Pulmonary:      Effort: Pulmonary effort is normal. No respiratory distress.   Skin:     General: Skin is warm and dry.   Neurological:      Mental Status: She is alert and oriented to person, place, and time.   Psychiatric:         Mood and Affect: Mood normal.         Behavior: Behavior normal.         Thought Content: Thought content normal.         Judgment: Judgment normal.         Assessment/Plan   Problem List Items Addressed This Visit             ICD-10-CM    Menorrhagia with regular cycle N92.0     Meeting milestones post operatively  Reviewed pathology - adenomyosis, endometrial polyp  May return to work immediately with lifting restrictions - letter provided and patient counseled  Continue pelvic rest and lifting restrictions through 2/21/2025 - patient understanding  No other concerns  Follow up PRN         Snoring - Primary R06.83     PCP referral for sleep study         Relevant Orders    Referral to Primary Care            Sarath Nogueira MD 01/31/25 9:39 AM

## 2025-01-31 NOTE — ASSESSMENT & PLAN NOTE
Meeting milestones post operatively  Reviewed pathology - adenomyosis, endometrial polyp  May return to work immediately with lifting restrictions - letter provided and patient counseled  Continue pelvic rest and lifting restrictions through 2/21/2025 - patient understanding  No other concerns  Follow up PRN

## 2025-01-31 NOTE — LETTER
January 31, 2025     Patient: Dayami Major   YOB: 1980   Date of Visit: 1/31/2025       To Whom It May Concern:    It is my medical opinion that Dayami Major may return to light duty immediately with the following restrictions: no lifting anything heavier than 15 pounds .    She may resume full work duties without restrictions February 21, 20225.    If you have any questions or concerns, please don't hesitate to call.         Sincerely,      Sarath Nogueira MD    CC: No Recipients

## 2025-05-27 ENCOUNTER — CLINICAL SUPPORT (OUTPATIENT)
Dept: EMERGENCY MEDICINE | Facility: HOSPITAL | Age: 45
End: 2025-05-27
Payer: COMMERCIAL

## 2025-05-27 ENCOUNTER — HOSPITAL ENCOUNTER (EMERGENCY)
Facility: HOSPITAL | Age: 45
Discharge: HOME | End: 2025-05-27
Payer: COMMERCIAL

## 2025-05-27 ENCOUNTER — APPOINTMENT (OUTPATIENT)
Dept: RADIOLOGY | Facility: HOSPITAL | Age: 45
End: 2025-05-27
Payer: COMMERCIAL

## 2025-05-27 VITALS
DIASTOLIC BLOOD PRESSURE: 81 MMHG | WEIGHT: 185 LBS | OXYGEN SATURATION: 98 % | HEART RATE: 92 BPM | RESPIRATION RATE: 18 BRPM | SYSTOLIC BLOOD PRESSURE: 131 MMHG | BODY MASS INDEX: 34.93 KG/M2 | HEIGHT: 61 IN | TEMPERATURE: 98.6 F

## 2025-05-27 DIAGNOSIS — J06.9 VIRAL URI WITH COUGH: Primary | ICD-10-CM

## 2025-05-27 LAB
FLUAV RNA RESP QL NAA+PROBE: NOT DETECTED
FLUBV RNA RESP QL NAA+PROBE: NOT DETECTED
RSV RNA RESP QL NAA+PROBE: NOT DETECTED
SARS-COV-2 RNA RESP QL NAA+PROBE: NOT DETECTED

## 2025-05-27 PROCEDURE — 93005 ELECTROCARDIOGRAM TRACING: CPT

## 2025-05-27 PROCEDURE — 71046 X-RAY EXAM CHEST 2 VIEWS: CPT | Mod: FOREIGN READ | Performed by: RADIOLOGY

## 2025-05-27 PROCEDURE — 2500000001 HC RX 250 WO HCPCS SELF ADMINISTERED DRUGS (ALT 637 FOR MEDICARE OP): Mod: SE

## 2025-05-27 PROCEDURE — 99285 EMERGENCY DEPT VISIT HI MDM: CPT | Mod: 25

## 2025-05-27 PROCEDURE — 87637 SARSCOV2&INF A&B&RSV AMP PRB: CPT

## 2025-05-27 PROCEDURE — 71046 X-RAY EXAM CHEST 2 VIEWS: CPT

## 2025-05-27 RX ORDER — BENZONATATE 100 MG/1
100 CAPSULE ORAL ONCE
Status: COMPLETED | OUTPATIENT
Start: 2025-05-27 | End: 2025-05-27

## 2025-05-27 RX ORDER — BENZONATATE 100 MG/1
100 CAPSULE ORAL 3 TIMES DAILY PRN
Qty: 15 CAPSULE | Refills: 0 | Status: SHIPPED | OUTPATIENT
Start: 2025-05-27 | End: 2025-06-01

## 2025-05-27 RX ADMIN — BENZONATATE 100 MG: 100 CAPSULE ORAL at 22:48

## 2025-05-27 ASSESSMENT — PAIN SCALES - GENERAL: PAINLEVEL_OUTOF10: 10 - WORST POSSIBLE PAIN

## 2025-05-27 ASSESSMENT — PAIN - FUNCTIONAL ASSESSMENT: PAIN_FUNCTIONAL_ASSESSMENT: 0-10

## 2025-05-27 ASSESSMENT — PAIN DESCRIPTION - LOCATION: LOCATION: GENERALIZED

## 2025-05-27 ASSESSMENT — COLUMBIA-SUICIDE SEVERITY RATING SCALE - C-SSRS
6. HAVE YOU EVER DONE ANYTHING, STARTED TO DO ANYTHING, OR PREPARED TO DO ANYTHING TO END YOUR LIFE?: NO
2. HAVE YOU ACTUALLY HAD ANY THOUGHTS OF KILLING YOURSELF?: NO
1. IN THE PAST MONTH, HAVE YOU WISHED YOU WERE DEAD OR WISHED YOU COULD GO TO SLEEP AND NOT WAKE UP?: NO

## 2025-05-27 ASSESSMENT — PAIN DESCRIPTION - PAIN TYPE: TYPE: ACUTE PAIN

## 2025-05-27 NOTE — ED TRIAGE NOTES
Pt complains of CP, SOB, Cough, Runny Nose, and sore throat. Pt states she was around her son who was sick. These symptoms started yesterday

## 2025-05-27 NOTE — Clinical Note
Dayami Major was seen and treated in our emergency department on 5/27/2025.  She may return to work on 06/03/2025.       If you have any questions or concerns, please don't hesitate to call.      Nnamdi Roberts PA-C N/A N/A N/A

## 2025-05-27 NOTE — ED PROVIDER NOTES
HPI   Chief Complaint   Patient presents with    Flu Symptoms       HPI    Dayami Major is a 45-year-old female with no significant medical history presenting the ED with productive cough that began yesterday.  Patient states she has has chest pain only when she coughs.  Patient states she has been coughing so hard that she vomits.  Patient denies blood in the vomit.  Patient states she has been short of breath at rest and with activity since yesterday.  Patient states she been having chills but denies fevers and bodyaches.  Patient states her son was sick recently with similar symptoms.  Patient denies nausea, sore throat.    Patient History   Medical History[1]  Surgical History[2]  Family History[3]  Social History[4]    Physical Exam   ED Triage Vitals [05/27/25 1841]   Temperature Heart Rate Respirations BP   37 °C (98.6 °F) 92 18 131/81      Pulse Ox Temp src Heart Rate Source Patient Position   98 % -- -- --      BP Location FiO2 (%)     -- --       Physical Exam  Vitals and nursing note reviewed.   Constitutional:       Appearance: Normal appearance.   Cardiovascular:      Rate and Rhythm: Normal rate and regular rhythm.      Heart sounds: Normal heart sounds. No murmur heard.     No gallop.   Pulmonary:      Effort: Pulmonary effort is normal. No respiratory distress.      Breath sounds: Normal breath sounds. No stridor. No wheezing, rhonchi or rales.   Abdominal:      General: Abdomen is flat. Bowel sounds are normal. There is no distension.      Palpations: Abdomen is soft. There is no mass.      Tenderness: There is no abdominal tenderness. There is no guarding or rebound.      Hernia: No hernia is present.   Musculoskeletal:      Right lower leg: No edema.      Left lower leg: No edema.   Skin:     General: Skin is warm and dry.   Neurological:      General: No focal deficit present.      Mental Status: She is alert and oriented to person, place, and time.   Psychiatric:         Mood and Affect: Mood  normal.         Behavior: Behavior normal.           ED Course & MDM   Diagnoses as of 05/28/25 0211   Viral URI with cough                 No data recorded     Tristen Coma Scale Score: 15 (05/27/25 1842 : Aure Frank, LAUREN)                           Medical Decision Making  This is a 45-year-old female presenting the ED with productive cough that began yesterday.  Chest x-ray was obtained for further evaluation of productive cough and shortness of breath with concern for pneumonia.  COVID, influenza, RSV swabs were obtained to determine etiology of patient's symptoms.  EKG was obtained for chest pain.  Patient is PERC negative.  COVID, influenza, RSV swabs are negative.  Chest x-ray shows no acute cardiopulmonary pathology.  Patient was given a dose of Tessalon Perles for cough.  Patient has been hemodynamically stable in the emergency department today.    EKG was obtained for chest pain.  EKG shows normal sinus rhythm with regular rate of 94 bpm, WY interval 110, QRS 64, QTc 452, normal axis deviation.  No ST elevations noted    Disposition: Discharge home  Patient advised to follow-up with her primary care provider for further evaluation management of her symptoms.  Prescription for Tessalon Perles to use as needed for cough has been sent to the patient's pharmacy.  Patient advised take medication as prescribed.  Strict return precautions were given.  Plan was discussed with the patient the patient understands and agrees.  Patient was discharged stable condition.    Procedure  Procedures       [1] No past medical history on file.  [2]   Past Surgical History:  Procedure Laterality Date    SALPINGECTOMY Bilateral 2018    Laparoscopic   [3]   Family History  Problem Relation Name Age of Onset    No Known Problems Mother      No Known Problems Father      Breast cancer Neg Hx      Ovarian cancer Neg Hx      Colon cancer Neg Hx     [4]   Social History  Tobacco Use    Smoking status: Every Day     Types: Cigars     Smokeless tobacco: Never    Tobacco comments:     3 black n milds per day   Vaping Use    Vaping status: Never Used   Substance Use Topics    Alcohol use: Yes     Comment: social    Drug use: Never        Nnamdi Roberts PA-C  05/28/25 0212

## 2025-05-28 LAB
ATRIAL RATE: 94 BPM
P AXIS: 32 DEGREES
P OFFSET: 218 MS
P ONSET: 176 MS
PR INTERVAL: 110 MS
Q ONSET: 231 MS
QRS COUNT: 15 BEATS
QRS DURATION: 64 MS
QT INTERVAL: 362 MS
QTC CALCULATION(BAZETT): 452 MS
QTC FREDERICIA: 420 MS
R AXIS: 71 DEGREES
T AXIS: -11 DEGREES
T OFFSET: 412 MS
VENTRICULAR RATE: 94 BPM

## 2025-05-28 NOTE — DISCHARGE INSTRUCTIONS
Please follow-up with your primary care provider.  Prescription for Tessalon Perles is sent to pharmacy.  Please take medication as prescribed for cough.  Return to the emergency department if your symptoms persist or worsen or any new symptoms began.

## 2025-07-12 ENCOUNTER — APPOINTMENT (OUTPATIENT)
Dept: RADIOLOGY | Facility: HOSPITAL | Age: 45
End: 2025-07-12
Payer: COMMERCIAL

## 2025-07-12 ENCOUNTER — HOSPITAL ENCOUNTER (EMERGENCY)
Facility: HOSPITAL | Age: 45
Discharge: HOME | End: 2025-07-12
Payer: COMMERCIAL

## 2025-07-12 VITALS
WEIGHT: 185 LBS | BODY MASS INDEX: 34.93 KG/M2 | HEART RATE: 105 BPM | RESPIRATION RATE: 18 BRPM | DIASTOLIC BLOOD PRESSURE: 88 MMHG | TEMPERATURE: 99 F | SYSTOLIC BLOOD PRESSURE: 129 MMHG | OXYGEN SATURATION: 98 % | HEIGHT: 61 IN
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
## 2025-07-12 DIAGNOSIS — M79.18 MUSCULOSKELETAL PAIN: ICD-10-CM

## 2025-07-12 PROCEDURE — 99284 EMERGENCY DEPT VISIT MOD MDM: CPT | Mod: 25

## 2025-07-12 PROCEDURE — 72131 CT LUMBAR SPINE W/O DYE: CPT

## 2025-07-12 PROCEDURE — 72131 CT LUMBAR SPINE W/O DYE: CPT | Performed by: RADIOLOGY

## 2025-07-12 PROCEDURE — 73564 X-RAY EXAM KNEE 4 OR MORE: CPT | Mod: BILATERAL PROCEDURE | Performed by: RADIOLOGY

## 2025-07-12 PROCEDURE — 2500000001 HC RX 250 WO HCPCS SELF ADMINISTERED DRUGS (ALT 637 FOR MEDICARE OP): Mod: SE | Performed by: NURSE PRACTITIONER

## 2025-07-12 PROCEDURE — 73564 X-RAY EXAM KNEE 4 OR MORE: CPT | Mod: 50

## 2025-07-12 RX ORDER — NAPROXEN 500 MG/1
500 TABLET ORAL 2 TIMES DAILY PRN
Qty: 30 TABLET | Refills: 0 | Status: SHIPPED | OUTPATIENT
Start: 2025-07-12 | End: 2025-07-27

## 2025-07-12 RX ORDER — IBUPROFEN 600 MG/1
600 TABLET, FILM COATED ORAL ONCE
Status: COMPLETED | OUTPATIENT
Start: 2025-07-12 | End: 2025-07-12

## 2025-07-12 RX ADMIN — IBUPROFEN 600 MG: 600 TABLET ORAL at 16:08

## 2025-07-12 ASSESSMENT — ENCOUNTER SYMPTOMS
BACK PAIN: 0
ABDOMINAL PAIN: 0
FEVER: 0
WEAKNESS: 0
CHILLS: 0
NECK PAIN: 0
SHORTNESS OF BREATH: 0
DIFFICULTY URINATING: 0
NAUSEA: 0
NUMBNESS: 0
HEADACHES: 0
COUGH: 0
DIZZINESS: 0
VOMITING: 0
WOUND: 0
JOINT SWELLING: 0

## 2025-07-12 ASSESSMENT — PAIN SCALES - GENERAL: PAINLEVEL_OUTOF10: 10 - WORST POSSIBLE PAIN

## 2025-07-12 ASSESSMENT — PAIN - FUNCTIONAL ASSESSMENT: PAIN_FUNCTIONAL_ASSESSMENT: 0-10
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
## 2025-07-12 NOTE — DISCHARGE INSTRUCTIONS
Alternate Tylenol/Ibuprofen as needed for discomfort   [phone] Modify Regimen: doxycycline hyclate 100 to 50 mg capsule QD Otc Regimen: CeraVe acne foaming cleanser and moisturizing cream Detail Level: Zone Continue Regimen: clindamycin phosphate 1 % topical solution QAM.\\ntretinoin 0.05 % topical cream QHS. Continue Regimen: triamcinolone acetonide 0.1 % topical ointment BID x 2 weeks, as needed for flares
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
## 2025-07-12 NOTE — Clinical Note
Dayami Major was seen and treated in our emergency department on 7/12/2025.  She may return to work on 07/14/2025.       If you have any questions or concerns, please don't hesitate to call.      Nataliia Nagel, APRN-CNP

## (undated) DEVICE — MANIFOLD, 4 PORT NEPTUNE STANDARD

## (undated) DEVICE — SUTURE, VICRYL, 0, 54 IN, CTD, UNDYED

## (undated) DEVICE — PAD, GROUNDING, ELECTROSURGICAL, W/9 FT CABLE, POLYHESIVE II, ADULT, LF

## (undated) DEVICE — TIP, SUCTION, YANKAUER, FLEXIBLE

## (undated) DEVICE — ACCESS PLATFORM, GELPOINT V-PATH, 9.5CM

## (undated) DEVICE — TOWEL, SURGICAL, NEURO, O/R, 16 X 26, BLUE, STERILE

## (undated) DEVICE — BASIN SET, D & C

## (undated) DEVICE — SYRINGE, HYPODERMIC, CONTROL, LUER LOCK, 10 CC, PLASTIC, STERILE

## (undated) DEVICE — COVER, CART, 45 X 27 X 48 IN, CLEAR

## (undated) DEVICE — STATLOCK, FOLEY SWIVEL, SILICONE TRICOT

## (undated) DEVICE — MARKER, SKIN, RULER AND LABEL PACK, CUSTOM

## (undated) DEVICE — SPONGE, GAUZE, XRAY DECT, 16 PLY, 4 X 4, W/MASTER DMT,STERILE

## (undated) DEVICE — SYRINGE, 60 CC, IRRIGATION, BULB, CONTRO-BULB, PAPER POUCH

## (undated) DEVICE — Device

## (undated) DEVICE — LIGASURE, SEALER/DIVIDER MARYLAND JAW, 5MM

## (undated) DEVICE — DRAPE, IRRIGATION, W/POUCH, ADHESIVE STRIP, STERI DRAPE, 19 X 23 IN, DISPOSABLE, STERILE

## (undated) DEVICE — REST, HEAD, BAGEL, 9 IN

## (undated) DEVICE — CAUTERY, PENCIL, PUSH BUTTON, SMOKE EVAC, 70MM

## (undated) DEVICE — GOWN, ASTOUND, L

## (undated) DEVICE — DRESSING, NON-ADHERENT, TELFA, OUCHLESS, 3 X 8 IN, STERILE

## (undated) DEVICE — SLEEVE, SURGICAL, 21.5 X 5.5 IN, LF, STERILE

## (undated) DEVICE — DRAPE,  SHEET, HALF SHEET, DOUBLE

## (undated) DEVICE — SPONGE, LAP, XRAY DECT, 18IN X 18IN, W/LOOP, STERILE

## (undated) DEVICE — GLOVE, SURGICAL, PROTEXIS,  6.0, PF, LATEX

## (undated) DEVICE — DRAPE PACK, LAVH, W/ATTACHED LEGGINGS, W/POUCH, 100 X 114 IN, LF, STERILE

## (undated) DEVICE — CATHETER, URETHRAL, FOLEY, 2 WAY, BARDEX IC, 16 FR, 5 CC, SILICONE

## (undated) DEVICE — COVER, EQUIPMENT, CAMERA, 5 X 96 IN, STERILE

## (undated) DEVICE — BAG, DRAINAGE, URINARY, URINE METER, INFECTION CONTROL, LF, 350ML

## (undated) DEVICE — PAD, SANITARY, OBSTETRICAL, W/ADHSV STRIP,11 IN,LF

## (undated) DEVICE — TUBING, SUCTION, CONNECTING, STERILE 0.25 X 120 IN., LF

## (undated) DEVICE — COUNTER, NEEDLE, FOAM BLOCK, POP-N-COUNT, W/BLADEGUARD, W/ADHESIVE 40 COUNT, RED

## (undated) DEVICE — SYRINGE, HYPODERMIC, LUER LOCK, 6 CC

## (undated) DEVICE — PREP TRAY, SKIN, DRY, W/GLOVES

## (undated) DEVICE — COVER, LIGHT HANDLE, SURGICAL, FLEXIBLE, DISPOSABLE, STERILE

## (undated) DEVICE — GOWN, ASTOUND, XL

## (undated) DEVICE — COVER, MAYO STAND, W/PAD, 23 IN, DISPOSABLE, PLASTIC, LF, STERILE

## (undated) DEVICE — CLEANER, ELECTROSURGICAL, TIP, 5 X 5 CM, LF

## (undated) DEVICE — COLLECTION BAG, FLUID, F/STERIS LOOP, STERILE

## (undated) DEVICE — SUTURE, VICRYL 0, TAPER POINT, CT-1 VIOLET 27 INCH

## (undated) DEVICE — DRAPE, PAD, PREP, W/ 9 IN CUFF, 24 X 41, LF, NS

## (undated) DEVICE — GLOVE, SURGICAL, PROTEXIS,  7.0, PF, LATEX

## (undated) DEVICE — COVER, TABLE, 44 X 75 IN, DISPOSABLE, LF, STERILE